# Patient Record
Sex: FEMALE | Race: WHITE | NOT HISPANIC OR LATINO | Employment: FULL TIME | URBAN - METROPOLITAN AREA
[De-identification: names, ages, dates, MRNs, and addresses within clinical notes are randomized per-mention and may not be internally consistent; named-entity substitution may affect disease eponyms.]

---

## 2024-01-01 ENCOUNTER — HOSPITAL ENCOUNTER (EMERGENCY)
Facility: CLINIC | Age: 34
Discharge: HOME | End: 2024-01-01
Attending: EMERGENCY MEDICINE | Admitting: EMERGENCY MEDICINE
Payer: COMMERCIAL

## 2024-01-01 ENCOUNTER — APPOINTMENT (OUTPATIENT)
Dept: CT IMAGING | Facility: CLINIC | Age: 34
End: 2024-01-01
Attending: EMERGENCY MEDICINE
Payer: COMMERCIAL

## 2024-01-01 VITALS
TEMPERATURE: 97.6 F | OXYGEN SATURATION: 99 % | RESPIRATION RATE: 16 BRPM | SYSTOLIC BLOOD PRESSURE: 144 MMHG | DIASTOLIC BLOOD PRESSURE: 87 MMHG | BODY MASS INDEX: 30.04 KG/M2 | WEIGHT: 175 LBS | HEART RATE: 89 BPM

## 2024-01-01 DIAGNOSIS — R07.9 CHEST PAIN, UNSPECIFIED TYPE: ICD-10-CM

## 2024-01-01 DIAGNOSIS — I30.8 OTHER ACUTE PERICARDITIS: ICD-10-CM

## 2024-01-01 DIAGNOSIS — I31.39 PERICARDIAL EFFUSION: ICD-10-CM

## 2024-01-01 LAB
ALBUMIN SERPL BCG-MCNC: 4.2 G/DL (ref 3.5–5.2)
ALP SERPL-CCNC: 90 U/L (ref 40–150)
ALT SERPL W P-5'-P-CCNC: 28 U/L (ref 0–50)
ANION GAP SERPL CALCULATED.3IONS-SCNC: 9 MMOL/L (ref 7–15)
AST SERPL W P-5'-P-CCNC: 28 U/L (ref 0–45)
BASOPHILS # BLD AUTO: 0.1 10E3/UL (ref 0–0.2)
BASOPHILS NFR BLD AUTO: 1 %
BILIRUB SERPL-MCNC: 0.3 MG/DL
BUN SERPL-MCNC: 15.7 MG/DL (ref 6–20)
CALCIUM SERPL-MCNC: 9.1 MG/DL (ref 8.6–10)
CHLORIDE SERPL-SCNC: 102 MMOL/L (ref 98–107)
CREAT SERPL-MCNC: 0.85 MG/DL (ref 0.51–0.95)
D DIMER PPP FEU-MCNC: 1.72 UG/ML FEU (ref 0–0.5)
DEPRECATED HCO3 PLAS-SCNC: 25 MMOL/L (ref 22–29)
EGFRCR SERPLBLD CKD-EPI 2021: >90 ML/MIN/1.73M2
EOSINOPHIL # BLD AUTO: 0.1 10E3/UL (ref 0–0.7)
EOSINOPHIL NFR BLD AUTO: 2 %
ERYTHROCYTE [DISTWIDTH] IN BLOOD BY AUTOMATED COUNT: 12.5 % (ref 10–15)
GLUCOSE SERPL-MCNC: 105 MG/DL (ref 70–99)
HCG SERPL QL: NEGATIVE
HCT VFR BLD AUTO: 36.9 % (ref 35–47)
HGB BLD-MCNC: 12.6 G/DL (ref 11.7–15.7)
IMM GRANULOCYTES # BLD: 0 10E3/UL
IMM GRANULOCYTES NFR BLD: 0 %
LYMPHOCYTES # BLD AUTO: 1.8 10E3/UL (ref 0.8–5.3)
LYMPHOCYTES NFR BLD AUTO: 25 %
MCH RBC QN AUTO: 29.4 PG (ref 26.5–33)
MCHC RBC AUTO-ENTMCNC: 34.1 G/DL (ref 31.5–36.5)
MCV RBC AUTO: 86 FL (ref 78–100)
MONOCYTES # BLD AUTO: 0.6 10E3/UL (ref 0–1.3)
MONOCYTES NFR BLD AUTO: 8 %
NEUTROPHILS # BLD AUTO: 4.7 10E3/UL (ref 1.6–8.3)
NEUTROPHILS NFR BLD AUTO: 64 %
NRBC # BLD AUTO: 0 10E3/UL
NRBC BLD AUTO-RTO: 0 /100
PLATELET # BLD AUTO: 289 10E3/UL (ref 150–450)
POTASSIUM SERPL-SCNC: 4.1 MMOL/L (ref 3.4–5.3)
PROT SERPL-MCNC: 7.1 G/DL (ref 6.4–8.3)
RBC # BLD AUTO: 4.28 10E6/UL (ref 3.8–5.2)
SODIUM SERPL-SCNC: 136 MMOL/L (ref 135–145)
TROPONIN T SERPL HS-MCNC: <6 NG/L
WBC # BLD AUTO: 7.3 10E3/UL (ref 4–11)

## 2024-01-01 PROCEDURE — 36415 COLL VENOUS BLD VENIPUNCTURE: CPT | Performed by: EMERGENCY MEDICINE

## 2024-01-01 PROCEDURE — 250N000009 HC RX 250: Performed by: EMERGENCY MEDICINE

## 2024-01-01 PROCEDURE — 85025 COMPLETE CBC W/AUTO DIFF WBC: CPT | Performed by: EMERGENCY MEDICINE

## 2024-01-01 PROCEDURE — 71275 CT ANGIOGRAPHY CHEST: CPT

## 2024-01-01 PROCEDURE — 80053 COMPREHEN METABOLIC PANEL: CPT | Performed by: EMERGENCY MEDICINE

## 2024-01-01 PROCEDURE — 84484 ASSAY OF TROPONIN QUANT: CPT | Performed by: EMERGENCY MEDICINE

## 2024-01-01 PROCEDURE — 84703 CHORIONIC GONADOTROPIN ASSAY: CPT | Performed by: EMERGENCY MEDICINE

## 2024-01-01 PROCEDURE — 85379 FIBRIN DEGRADATION QUANT: CPT | Performed by: EMERGENCY MEDICINE

## 2024-01-01 PROCEDURE — 250N000011 HC RX IP 250 OP 636: Performed by: EMERGENCY MEDICINE

## 2024-01-01 PROCEDURE — 99285 EMERGENCY DEPT VISIT HI MDM: CPT | Mod: 25

## 2024-01-01 RX ORDER — IOPAMIDOL 755 MG/ML
66 INJECTION, SOLUTION INTRAVASCULAR ONCE
Status: COMPLETED | OUTPATIENT
Start: 2024-01-01 | End: 2024-01-01

## 2024-01-01 RX ORDER — DOXYCYCLINE 100 MG/1
100 CAPSULE ORAL EVERY 12 HOURS SCHEDULED
Status: DISCONTINUED | OUTPATIENT
Start: 2024-01-01 | End: 2024-01-01

## 2024-01-01 RX ADMIN — SODIUM CHLORIDE 91 ML: 9 INJECTION, SOLUTION INTRAVENOUS at 02:45

## 2024-01-01 RX ADMIN — IOPAMIDOL 66 ML: 755 INJECTION, SOLUTION INTRAVENOUS at 02:45

## 2024-01-01 ASSESSMENT — ACTIVITIES OF DAILY LIVING (ADL)
ADLS_ACUITY_SCORE: 35
ADLS_ACUITY_SCORE: 35

## 2024-01-01 NOTE — ED TRIAGE NOTES
Pt arrives via triage presenting with sudden onset upper chest pressure beginning about 30 minutes ago.      Triage Assessment (Adult)       Row Name 01/01/24 0052          Triage Assessment    Airway WDL WDL        Respiratory WDL    Respiratory WDL WDL        Skin Circulation/Temperature WDL    Skin Circulation/Temperature WDL WDL        Cardiac WDL    Cardiac WDL chest pain        Chest Pain Assessment    Chest Pain Location epigastric

## 2024-01-01 NOTE — DISCHARGE INSTRUCTIONS
Please take ibuprofen, 600 mg every 8 hours for the next 10 days.  Please then wean off of the ibuprofen over the next 3 weeks.  Please take it for total of 4 weeks.  I have ordered an outpatient echocardiogram for you.  They will call to schedule.  I have ordered a cardiology consult, they will call you to schedule.    Discharge Instructions  Chest Pain    You have been seen today for chest pain or discomfort.  At this time, your provider has found no signs that your chest pain is due to a serious or life-threatening condition, (or you have declined more testing and/or admission to the hospital). However, sometimes there is a serious problem that does not show up right away. Your evaluation today may not be complete and you may need further testing and evaluation.     Generally, every Emergency Department visit should have a follow-up clinic visit with either a primary or a specialty clinic/provider. Please follow-up as instructed by your emergency provider today.  Return to the Emergency Department if:  Your chest pain changes, gets worse, starts to happen more often, or comes with less activity.  You are newly short of breath.  You get very weak or tired.  You pass out or faint.  You have any new symptoms, like fever, cough, numb legs, or you cough up blood.  You have anything else that worries you.    Until you follow-up with your regular provider, please do the following:  Take one aspirin daily unless you have an allergy or are told not to by your provider.  If a stress test appointment has been made, go to the appointment.  If you have questions, contact your regular provider.  Follow-up with your regular provider/clinic as directed; this is very important.    If you were given a prescription for medicine here today, be sure to read all of the information (including the package insert) that comes with your prescription.  This will include important information about the medicine, its side effects, and any  warnings that you need to know about.  The pharmacist who fills the prescription can provide more information and answer questions you may have about the medicine.  If you have questions or concerns that the pharmacist cannot address, please call or return to the Emergency Department.       Remember that you can always come back to the Emergency Department if you are not able to see your regular provider in the amount of time listed above, if you get any new symptoms, or if there is anything that worries you.

## 2024-01-01 NOTE — ED PROVIDER NOTES
History     Chief Complaint:  Chest Pain       HPI   Kyra Panda is a 33 year old female who presents to the ED with chest pain. Patient reports her chest pain started around 2 weeks ago. She describes the chest pain as a pressure. Notes exacerbating factors that include deep breathes, bending over, standing up, and laying flat. Denies fever and coughing. Denies recent illness, history of blood clots, diabetes mellitus, hypertension, and hypercholesterolemia.     Independent Historian:   None - Patient Only    Review of External Notes:          Medications:    Mirena   Cytotec   Keflex    Past Medical History:    Idiopathic thrombocytopenic purpura    Past Surgical History:    History reviewed. No pertinent surgical history.     Physical Exam   Patient Vitals for the past 24 hrs:   BP Temp Pulse Resp SpO2 Weight   01/01/24 0300 (!) 144/87 -- 89 -- 99 % --   01/01/24 0229 135/80 -- 87 -- -- --   01/01/24 0054 (!) 154/69 97.6  F (36.4  C) 93 16 100 % 79.4 kg (175 lb)        Physical Exam  General: Well-nourished, resting comfortably when I enter the room  Eyes: Pupils equal, conjunctivae pink no scleral icterus or conjunctival injection  ENT:  Moist mucus membranes  Respiratory:  Lungs clear to auscultation bilaterally, no crackles/rubs/wheezes.  Good air movement  CV: Normal rate and rhythm, no murmurs  GI:  Abdomen soft and non-distended.  No tenderness, guarding or rebound  Skin: Warm, dry.  No rashes or petechiae  Musculoskeletal: No peripheral edema or calf tenderness  Neuro: Alert and oriented to person/place/time  Psychiatric: Normal affect      Emergency Department Course   ekg      Imaging:  CT Chest Pulmonary Embolism w Contrast   Final Result   IMPRESSION:       1.  Negative for pulmonary embolism.      2.  Small pericardial effusion. This is of indeterminate etiology but could account for the patient's acute symptoms. Clinical correlation.      3. No evidence for pneumonitis and no significant  findings elsewhere in the chest.      Echocardiogram Complete    (Results Pending)      Laboratory:  Labs Ordered and Resulted from Time of ED Arrival to Time of ED Departure   COMPREHENSIVE METABOLIC PANEL - Abnormal       Result Value    Sodium 136      Potassium 4.1      Carbon Dioxide (CO2) 25      Anion Gap 9      Urea Nitrogen 15.7      Creatinine 0.85      GFR Estimate >90      Calcium 9.1      Chloride 102      Glucose 105 (*)     Alkaline Phosphatase 90      AST 28      ALT 28      Protein Total 7.1      Albumin 4.2      Bilirubin Total 0.3     D DIMER QUANTITATIVE - Abnormal    D-Dimer Quantitative 1.72 (*)    TROPONIN T, HIGH SENSITIVITY - Normal    Troponin T, High Sensitivity <6     HCG QUALITATIVE PREGNANCY - Normal    hCG Serum Qualitative Negative     CBC WITH PLATELETS AND DIFFERENTIAL    WBC Count 7.3      RBC Count 4.28      Hemoglobin 12.6      Hematocrit 36.9      MCV 86      MCH 29.4      MCHC 34.1      RDW 12.5      Platelet Count 289      % Neutrophils 64      % Lymphocytes 25      % Monocytes 8      % Eosinophils 2      % Basophils 1      % Immature Granulocytes 0      NRBCs per 100 WBC 0      Absolute Neutrophils 4.7      Absolute Lymphocytes 1.8      Absolute Monocytes 0.6      Absolute Eosinophils 0.1      Absolute Basophils 0.1      Absolute Immature Granulocytes 0.0      Absolute NRBCs 0.0          Emergency Department Course & Assessments:    Interventions:  Medications   iopamidol (ISOVUE-370) solution 66 mL (66 mLs Intravenous $Given 1/1/24 0245)   sodium chloride 0.9 % CT scan flush use (91 mLs Intravenous $Given 1/1/24 0245)        Assessments:  0109 I obtained the history and examined the patient as noted above.    Independent Interpretation (X-rays, CTs, rhythm strip):      Consultations/Discussion of Management or Tests:  None        Social Determinants of Health affecting care:   None    Disposition:  The patient was discharged to home.     Impression & Plan    CMS Diagnoses:  None    Medical Decision Makin-year-old otherwise healthy female presents emergency department with a complaint of chest pain.  Patient reports that she has been having chest pain for the past 2 weeks.  Over the past week it has increased, she is having pain when she takes a deep breath then and as well when she bends over.  Tonight when she woke up out of sleep she was pale and diaphoretic, she felt lightheaded.  On exam patient is not in any acute distress.  She is not tachypneic, hypoxic, or tachycardic.  Lab work is reassuring, her D-dimer is elevated and we will get a chest CT to evaluate for PE.  CT shows a small pericardial effusion, no signs of PE or pneumonia.  Lab work is otherwise reassuring.  EKG does not show any signs of pericarditis.  Clinically along with the pericardial effusion, this would correlate with pericarditis.  I will have the patient get a outpatient echo as well as a cardiology consult.  Patient is informed of her results.  I will start her on anti-inflammatories at home.  Patient is discharged home.      Diagnosis:    ICD-10-CM    1. Chest pain, unspecified type  R07.9 Echocardiogram Complete     Adult Cardiology Eval  Referral     CANCELED: Echocardiogram Complete     CANCELED: Adult Cardiology Eval  Referral      2. Other acute pericarditis  I30.8 Echocardiogram Complete     Adult Cardiology Eval  Referral      3. Pericardial effusion  I31.39 Echocardiogram Complete     Adult Cardiology Eval  Referral           Discharge Medications:  Discharge Medication List as of 2024  3:42 AM           Scribe Disclosure:  IJamie, am serving as a scribe at 1:17 AM on 2024 to document services personally performed by Marixa Rucker MD based on my observations and the provider's statements to me.     2024   Marixa Rucker MD Richardson, Elizabeth, MD  [phone]

## 2024-01-18 ENCOUNTER — HOSPITAL ENCOUNTER (OUTPATIENT)
Dept: CARDIOLOGY | Facility: CLINIC | Age: 34
Discharge: HOME | End: 2024-01-18
Attending: EMERGENCY MEDICINE | Admitting: EMERGENCY MEDICINE
Payer: COMMERCIAL

## 2024-01-18 DIAGNOSIS — I30.8 OTHER ACUTE PERICARDITIS: ICD-10-CM

## 2024-01-18 DIAGNOSIS — I31.39 PERICARDIAL EFFUSION: ICD-10-CM

## 2024-01-18 DIAGNOSIS — R07.9 CHEST PAIN, UNSPECIFIED TYPE: ICD-10-CM

## 2024-01-18 LAB — LVEF ECHO: NORMAL

## 2024-01-18 PROCEDURE — 93306 TTE W/DOPPLER COMPLETE: CPT

## 2024-01-18 PROCEDURE — 93306 TTE W/DOPPLER COMPLETE: CPT | Mod: 26 | Performed by: INTERNAL MEDICINE

## 2024-01-23 ENCOUNTER — APPOINTMENT (OUTPATIENT)
Dept: GENERAL RADIOLOGY | Facility: CLINIC | Age: 34
End: 2024-01-23
Attending: SOCIAL WORKER
Payer: COMMERCIAL

## 2024-01-23 LAB
ANION GAP SERPL CALCULATED.3IONS-SCNC: 8 MMOL/L (ref 7–15)
BUN SERPL-MCNC: 13 MG/DL (ref 6–20)
CALCIUM SERPL-MCNC: 8.9 MG/DL (ref 8.6–10)
CHLORIDE SERPL-SCNC: 103 MMOL/L (ref 98–107)
CREAT SERPL-MCNC: 0.74 MG/DL (ref 0.51–0.95)
DEPRECATED HCO3 PLAS-SCNC: 27 MMOL/L (ref 22–29)
EGFRCR SERPLBLD CKD-EPI 2021: >90 ML/MIN/1.73M2
ERYTHROCYTE [DISTWIDTH] IN BLOOD BY AUTOMATED COUNT: 12.8 % (ref 10–15)
GLUCOSE SERPL-MCNC: 108 MG/DL (ref 70–99)
HCG INTACT+B SERPL-ACNC: <1 MIU/ML
HCT VFR BLD AUTO: 35.2 % (ref 35–47)
HGB BLD-MCNC: 12.1 G/DL (ref 11.7–15.7)
MCH RBC QN AUTO: 30 PG (ref 26.5–33)
MCHC RBC AUTO-ENTMCNC: 34.4 G/DL (ref 31.5–36.5)
MCV RBC AUTO: 87 FL (ref 78–100)
PLATELET # BLD AUTO: 237 10E3/UL (ref 150–450)
POTASSIUM SERPL-SCNC: 4.2 MMOL/L (ref 3.4–5.3)
RBC # BLD AUTO: 4.04 10E6/UL (ref 3.8–5.2)
SODIUM SERPL-SCNC: 138 MMOL/L (ref 135–145)
TROPONIN T SERPL HS-MCNC: <6 NG/L
WBC # BLD AUTO: 7.1 10E3/UL (ref 4–11)

## 2024-01-23 PROCEDURE — 85027 COMPLETE CBC AUTOMATED: CPT | Performed by: EMERGENCY MEDICINE

## 2024-01-23 PROCEDURE — 80048 BASIC METABOLIC PNL TOTAL CA: CPT | Performed by: EMERGENCY MEDICINE

## 2024-01-23 PROCEDURE — 84484 ASSAY OF TROPONIN QUANT: CPT | Performed by: EMERGENCY MEDICINE

## 2024-01-23 PROCEDURE — 84702 CHORIONIC GONADOTROPIN TEST: CPT | Performed by: EMERGENCY MEDICINE

## 2024-01-23 PROCEDURE — 85027 COMPLETE CBC AUTOMATED: CPT | Performed by: SOCIAL WORKER

## 2024-01-23 PROCEDURE — 71046 X-RAY EXAM CHEST 2 VIEWS: CPT

## 2024-01-23 PROCEDURE — 84484 ASSAY OF TROPONIN QUANT: CPT | Performed by: SOCIAL WORKER

## 2024-01-23 PROCEDURE — 84443 ASSAY THYROID STIM HORMONE: CPT | Performed by: EMERGENCY MEDICINE

## 2024-01-23 PROCEDURE — 99285 EMERGENCY DEPT VISIT HI MDM: CPT | Mod: 25

## 2024-01-23 PROCEDURE — 36415 COLL VENOUS BLD VENIPUNCTURE: CPT | Performed by: SOCIAL WORKER

## 2024-01-23 PROCEDURE — 93005 ELECTROCARDIOGRAM TRACING: CPT

## 2024-01-23 PROCEDURE — 84439 ASSAY OF FREE THYROXINE: CPT | Performed by: EMERGENCY MEDICINE

## 2024-01-23 PROCEDURE — 80048 BASIC METABOLIC PNL TOTAL CA: CPT | Performed by: SOCIAL WORKER

## 2024-01-24 ENCOUNTER — HOSPITAL ENCOUNTER (EMERGENCY)
Facility: CLINIC | Age: 34
Discharge: HOME | End: 2024-01-24
Attending: EMERGENCY MEDICINE | Admitting: EMERGENCY MEDICINE
Payer: COMMERCIAL

## 2024-01-24 ENCOUNTER — TELEPHONE (OUTPATIENT)
Dept: EMERGENCY MEDICINE | Facility: CLINIC | Age: 34
End: 2024-01-24
Payer: COMMERCIAL

## 2024-01-24 VITALS
TEMPERATURE: 98.3 F | SYSTOLIC BLOOD PRESSURE: 117 MMHG | WEIGHT: 180 LBS | HEART RATE: 82 BPM | RESPIRATION RATE: 20 BRPM | OXYGEN SATURATION: 98 % | BODY MASS INDEX: 30.73 KG/M2 | HEIGHT: 64 IN | DIASTOLIC BLOOD PRESSURE: 72 MMHG

## 2024-01-24 DIAGNOSIS — I31.39 PERICARDIAL EFFUSION: ICD-10-CM

## 2024-01-24 LAB
ATRIAL RATE - MUSE: 86 BPM
DIASTOLIC BLOOD PRESSURE - MUSE: NORMAL MMHG
INTERPRETATION ECG - MUSE: NORMAL
P AXIS - MUSE: 74 DEGREES
PR INTERVAL - MUSE: 166 MS
QRS DURATION - MUSE: 80 MS
QT - MUSE: 356 MS
QTC - MUSE: 426 MS
R AXIS - MUSE: 69 DEGREES
SYSTOLIC BLOOD PRESSURE - MUSE: NORMAL MMHG
T AXIS - MUSE: 45 DEGREES
T4 FREE SERPL-MCNC: 1.18 NG/DL (ref 0.9–1.7)
TSH SERPL DL<=0.005 MIU/L-ACNC: 6.11 UIU/ML (ref 0.3–4.2)
VENTRICULAR RATE- MUSE: 86 BPM

## 2024-01-24 RX ORDER — COLCHICINE 0.6 MG/1
0.6 TABLET ORAL DAILY
Qty: 30 TABLET | Refills: 0 | Status: SHIPPED | OUTPATIENT
Start: 2024-01-24 | End: 2024-02-02

## 2024-01-24 ASSESSMENT — ACTIVITIES OF DAILY LIVING (ADL): ADLS_ACUITY_SCORE: 35

## 2024-01-24 NOTE — TELEPHONE ENCOUNTER
[facility]    Reason for call: Lab Result Notification     Lab Result (including Rx patient on, if applicable).  If culture, copy of lab report at bottom.  Lab Result:     Resulted after [facility] Emergency Dept visit on this date 1/23/24.  [facility] patients, route result to PCP.   Non [facility] patients, RN to notify patient/parent of result and advise to relay result to their PCP immediately.  [ED lab result f/u RN may want to fax result to PCP].    Component      Latest Ref Rng 1/23/2024  9:50 PM   TSH      0.30 - 4.20 uIU/mL 6.11 (H)    T4 Free      0.90 - 1.70 ng/dL 1.18       Legend:  (H) High    Creatinine Level (mg/dl)   Creatinine   Date Value Ref Range Status   01/23/2024 0.74 0.51 - 0.95 mg/dL Final   03/21/2012 0.79 0.52 - 1.04 mg/dL Final    Creatinine clearance (ml/min), if applicable Serum creatinine: 0.74 mg/dL 01/23/24 2150  Estimated creatinine clearance: 112.9 mL/min     Patient's current Symptoms:   Left message then received return call from patient     RN Recommendations/Instructions per [place] ED lab result protocol:   [facility] ED lab result protocol utilized: [facility]  RN reviewed information about lab notification, follow up with primary care for interpretation and recommendation. Patient agrees with plan..     Patient/care giver notified to contact your PCP clinic or return to the Emergency department if your:  Symptoms return.  Symptoms worsen or other concerning symptoms.    Mayco Raymond RN

## 2024-01-24 NOTE — DISCHARGE INSTRUCTIONS
The colchicine as directed.  Discussed with cardiology whether or not this medicine should be continued or not.

## 2024-01-24 NOTE — ED PROVIDER NOTES
History     Chief Complaint:  Chest Pain       HPI   Kyra Panda is a 33 year old female who presents to the ED with recurrent symptoms of mild chest discomfort with supine positioning, and feeling at times a sense of dyspnea.  The patient was seen on New Year's Day on January 1 and underwent an evaluation for this.  She had a slightly elevated D-dimer which triggered a CT scan of the chest which did not show PE, pneumonia, pleural effusions or other pulmonary etiologies, it did show a trace pericardial effusion.  The patient was sent home with a presumptive diagnosis of possible pericarditis and took ibuprofen for 10 days with good symptom relief.  She had some mild recurrent symptoms and ended up getting an outpatient cardiac echo which was negative except for trivial/trace pericardial effusion with a normal ejection fraction.  She has an outpatient upcoming appointment/consultation with cardiology.  She has had some ongoing recurrence of symptoms which prompted her visit to the emergency department.  She is not having chest pressure or tightness.  She gets feelings largely when supine feeling a little bit more short of breath.  If she takes a maximal deep breath she does feel a sense of pleuritic chest discomfort so she breathes more shallowly.      Independent Historian:   None    Review of External Notes:  Recent laboratories, and the recent CT scan and echo were reviewed.    Allergies:  No Known Allergies     Listed Medications:    colchicine (COLCRYS) 0.6 MG tablet  levonorgestrel (MIRENA) 20 MCG/24HR IUD  misoprostol (CYTOTEC) 200 MCG tablet        Past Medical and Surgical History:    Past Medical History:   Diagnosis Date    ITP (idiopathic thrombocytopenic purpura)      No past surgical history on file.     Family History:    family history is not on file.    Social History:   reports that she has never smoked. She does not have any smokeless tobacco history on file. She reports current alcohol  "use. She reports that she does not use drugs.      Physical Exam   Patient Vitals for the past 24 hrs:   BP Temp Temp src Pulse Resp SpO2 Height Weight   01/24/24 0532 117/72 -- -- 82 20 98 % -- --   01/24/24 0502 116/77 -- -- 78 17 99 % -- --   01/24/24 0433 117/79 -- -- 79 18 100 % -- --   01/23/24 2139 (!) 140/75 98.3  F (36.8  C) Oral 89 16 100 % 1.626 m (5' 4\") 81.6 kg (180 lb)        General: Resting comfortably on the gurney  Head:  The scalp, face, and head appear normal  Eyes:  The pupils are equal, round, and reactive to light    There is no nystagmus    Extraocular muscles are intact    Conjunctivae and sclerae are normal  ENT:    The nose is normal    Pinnae are normal    The oropharynx is normal  Neck:  Normal range of motion    There is no rigidity noted  CV:  Regular rate  Normal underlying rhythm     Normal S1/S2    No pathological murmur detected  Resp:  Lungs are clear    There is no tachypnea    Non-labored    No rales    No wheezing   MS:  Symmetric motor strength    No major joint effusions    No asymmetric leg swelling, no calf tenderness  Skin:  No rash or acute skin lesions noted  Neuro:  Speech is normal and fluent, there is no aphasia    No motor deficits    Cranial nerves are intact  Psych: Awake. Alert.      Normal affect.  Appropriate interactions.            Emergency Department Course       Imaging:  Chest XR,  PA & LAT   Final Result   IMPRESSION: No focal airspace consolidation. No pleural effusion or pneumothorax.      Upper limits of normal heart size.      There is lucency involving the right neck soft tissues, possibly artifactual. Correlate for soft tissue abnormality in this location and consider dedicated neck imaging, if clinically indicated.           Reports in this section have been read by the radiologist.    Laboratory:  Labs Ordered and Resulted from Time of ED Arrival to Time of ED Departure   BASIC METABOLIC PANEL - Abnormal       Result Value    Sodium 138      " Potassium 4.2      Chloride 103      Carbon Dioxide (CO2) 27      Anion Gap 8      Urea Nitrogen 13.0      Creatinine 0.74      GFR Estimate >90      Calcium 8.9      Glucose 108 (*)    CBC WITH PLATELETS - Normal    WBC Count 7.1      RBC Count 4.04      Hemoglobin 12.1      Hematocrit 35.2      MCV 87      MCH 30.0      MCHC 34.4      RDW 12.8      Platelet Count 237     TROPONIN T, HIGH SENSITIVITY - Normal    Troponin T, High Sensitivity <6     HCG QUANTITATIVE PREGNANCY - Normal    hCG Quantitative <1          Procedures:  Procedures       Emergency Department Course & Assessments:             Interventions/ED Medications:  Medications - No data to display       Independent Interpretation of Radiology Studies by Dr. Pascual  Chest x-ray is reviewed and is within normal limits    Assessments/Consultations/Discussion of Management:     Patient was assessed for initial H&P.  I then reviewed her echo and chest CT that was done recently and met with her again to go over all of these results.    Disposition:  Home    Impression & Plan        Medical Decision Making:  This patient presents with recurrent symptoms of mild dyspnea and a sense of pleuritic chest pain with maximal deep breath.  The patient's laboratories are within normal limits.  Her troponin is undetectable.  She had a recent echo which was normal except for a trivial pericardial effusion.  She had a recent CT scan of the chest which showed no PE and a very small pericardial effusion.  The patient more than likely has had a episode of recent pericarditis or an idiopathic pericardial effusion that is quite small and she may be having symptoms from this.  She has cardiology consultation within the next week.  I am going to start the patient on colchicine once daily for the next several weeks until she can discuss this treatment option with the cardiologist.  She does not technically have recurrent pericarditis because this would be her first episode but she  does have recurrence in symptoms and an ongoing very small pericardial effusion.  No other life-threatening etiologies are detected based on physical examination or recent workup.  There is a broad differential diagnosis to feelings of shortness of breath and chest discomfort which includes acute coronary syndrome,, myocardial infarction, PE, pericarditis, pneumonia, pneumothorax, the patient has been extensively evaluated for all of these etiologies and no life-threatening etiology has been noted.  The only finding on her workup to date is a very small pericardial effusion which may be causing recurrent symptoms.        Diagnosis:    ICD-10-CM    1. Pericardial effusion  I31.39              Discharge Medications (if applicable):  Discharge Medication List as of 1/24/2024  5:27 AM        START taking these medications    Details   colchicine (COLCRYS) 0.6 MG tablet Take 1 tablet (0.6 mg) by mouth daily for 30 days, Disp-30 tablet, R-0, E-Prescribe                Luis Pascual MD  1/24/2024   No att. providers found        Luis Pascual MD  01/24/24 7799

## 2024-01-24 NOTE — ED TRIAGE NOTES
Patient was seen earlier this month for chest pain and was dx with pleural effusions. Patient had echo the other day and has an apt with cardiology coming up. Over the lst couple days patient noticed an increase in chest pain, cough, and a little SOB.      Triage Assessment (Adult)       Row Name 01/23/24 6326          Triage Assessment    Airway WDL WDL        Respiratory WDL    Respiratory WDL X;cough     Cough Frequency infrequent        Skin Circulation/Temperature WDL    Skin Circulation/Temperature WDL WDL        Cardiac WDL    Cardiac WDL X;chest pain  pressure, pain with movement        Chest Pain Assessment    Chest Pain Location anterior chest, right;arm, left     Character pressure        Peripheral/Neurovascular WDL    Peripheral Neurovascular WDL WDL        Cognitive/Neuro/Behavioral WDL    Cognitive/Neuro/Behavioral WDL WDL        Rosa Coma Scale    Best Eye Response 4-->(E4) spontaneous     Best Motor Response 6-->(M6) obeys commands     Best Verbal Response 5-->(V5) oriented     Rosa Coma Scale Score 15

## 2024-01-25 ENCOUNTER — OFFICE VISIT (OUTPATIENT)
Dept: PEDIATRICS | Facility: CLINIC | Age: 34
End: 2024-01-25
Payer: COMMERCIAL

## 2024-01-25 VITALS
SYSTOLIC BLOOD PRESSURE: 104 MMHG | BODY MASS INDEX: 32.01 KG/M2 | TEMPERATURE: 98.1 F | OXYGEN SATURATION: 99 % | DIASTOLIC BLOOD PRESSURE: 66 MMHG | WEIGHT: 187.5 LBS | HEIGHT: 64 IN | HEART RATE: 78 BPM

## 2024-01-25 DIAGNOSIS — E03.8 SUBCLINICAL HYPOTHYROIDISM: Primary | ICD-10-CM

## 2024-01-25 DIAGNOSIS — Z86.2 HISTORY OF ITP: ICD-10-CM

## 2024-01-25 DIAGNOSIS — I31.9 PERICARDITIS, UNSPECIFIED CHRONICITY, UNSPECIFIED TYPE: ICD-10-CM

## 2024-01-25 DIAGNOSIS — Z82.69 FAMILY HISTORY OF SYSTEMIC LUPUS ERYTHEMATOSUS: ICD-10-CM

## 2024-01-25 PROCEDURE — 99204 OFFICE O/P NEW MOD 45 MIN: CPT | Performed by: NURSE PRACTITIONER

## 2024-01-25 ASSESSMENT — PAIN SCALES - GENERAL: PAINLEVEL: MODERATE PAIN (4)

## 2024-01-25 NOTE — PROGRESS NOTES
"  Assessment & Plan     Family history of systemic lupus erythematosus    History of ITP    Subclinical hypothyroidism  Incidental finding, likely unrelated to pericarditis. Explained pathophys of subclinical hypothyroidism. Pt is essentially asymptomatic. Recheck 8 weeks  - TSH with free T4 reflex; Future  - Thyroid peroxidase antibody; Future    Pericarditis, unspecified chronicity, unspecified type  Improving somewhat. Per uptodate, NSAIDs plus colchicine are more effective than colchicine alone. Re-start NSAIDs. Reviewed r/b/se of both meds. Reviewed reasons to seek care again in the ED. Seeing cards next week          BMI  Estimated body mass index is 32.57 kg/m  as calculated from the following:    Height as of this encounter: 1.616 m (5' 3.62\").    Weight as of this encounter: 85 kg (187 lb 8 oz).   Weight management plan: Discussed healthy diet and exercise guidelines        Dee Rae is a 33 year old, presenting for the following health issues:  ER F/U      1/25/2024     4:43 PM   Additional Questions   Roomed by Tiffanie Siddiqi   Accompanied by N/A         1/25/2024     4:43 PM   Patient Reported Additional Medications   Patient reports taking the following new medications Yes, Colchicine 0.6 MG     HPI       ED/UC Followup:    Facility:  [facility] Emergency Dept  Date of visit: 1/1/2024 and 1/24/2024  Reason for visit: Pericardial effusion   Current Status: Better the symptom still their.            Objective    /66 (BP Location: Right arm, Patient Position: Sitting, Cuff Size: Adult Regular)   Pulse 78   Temp 98.1  F (36.7  C) (Tympanic)   Ht 1.616 m (5' 3.62\")   Wt 85 kg (187 lb 8 oz)   SpO2 99%   BMI 32.57 kg/m    Body mass index is 32.57 kg/m .  Physical Exam   CONSTITUTIONAL: Alert, well-nourished, well-groomed, NAD  HRRR S1 S2 No MRG. No peripheral edema  Lungs CTA. No wheeze, rhonchi, rales.  Neck: No lymphadenopathy or masses. Thyroid smooth, non-tender, and " non-enlarged.  PSYCH: Bright affect. Appropriate mentation and speech.           Signed Electronically by: JAZMINE HATFIELD CNP

## 2024-01-25 NOTE — PATIENT INSTRUCTIONS
You have subclinical hypothyroidism  Possibly will turn into hypothyroidism. Most of these are hashimoto's, which is autoimmune.     Along with colchicine, add 600mg ibuprofen every 6 hours with food. If that doesn't seem to improve symptoms, let me know and we might try a different NSAID called indomethacin    Let me know if worsening

## 2024-01-31 ENCOUNTER — OFFICE VISIT (OUTPATIENT)
Dept: CARDIOLOGY | Facility: CLINIC | Age: 34
End: 2024-01-31
Attending: EMERGENCY MEDICINE
Payer: COMMERCIAL

## 2024-01-31 VITALS
BODY MASS INDEX: 31.76 KG/M2 | HEART RATE: 78 BPM | WEIGHT: 186 LBS | HEIGHT: 64 IN | SYSTOLIC BLOOD PRESSURE: 113 MMHG | DIASTOLIC BLOOD PRESSURE: 75 MMHG

## 2024-01-31 DIAGNOSIS — R07.9 CHEST PAIN, UNSPECIFIED TYPE: ICD-10-CM

## 2024-01-31 DIAGNOSIS — I31.39 PERICARDIAL EFFUSION: ICD-10-CM

## 2024-01-31 DIAGNOSIS — I30.8 OTHER ACUTE PERICARDITIS: ICD-10-CM

## 2024-01-31 PROCEDURE — 99204 OFFICE O/P NEW MOD 45 MIN: CPT | Performed by: INTERNAL MEDICINE

## 2024-01-31 RX ORDER — IBUPROFEN 600 MG/1
600 TABLET, FILM COATED ORAL EVERY 6 HOURS PRN
COMMUNITY
End: 2024-02-12

## 2024-01-31 NOTE — PROGRESS NOTES
HPI and Plan:   Patient is a very nice 33-year-old woman who has been dealing with pericarditis over the last month.  She thinks symptoms started back in December where she would notice that deep breathing would cause some chest discomfort.  It then became acutely worse such that she has significant symptoms when lying down and was seen in the ER.  She had elevated D-dimer for which she had a negative CT scan.  She was started on ibuprofen for 10 days.  This resulted in resolution of her symptoms only to return after she completed her 10-day course.  She was then seen in the ER again at which time she was started on colchicine once daily.  She had improvement in symptoms but did not have complete resolution.  She followed up with her primary care doctor who reinitiated NSAIDs.  She had an echocardiogram done on the 18th described an ejection fraction of 60 to 65% with trivial pericardial effusion.  EKG was a normal sinus rhythm and a normal EKG.  Kyra does not remember any specific viral syndrome.    Kyra does have a family history of lupus and rheumatoid arthritis in her mom and aunt.  As a child she had ITP.    She is currently feeling much better but has not been doing anything and wants to know if she can get back to walking.  She has not been sleeping flat in the bed yet.  She has stopped all caffeine and alcohol and wants to know if she can resume these.  She normally just drinks alcohol at social occasions.  She states she does have a bachelorette party coming up.  She also is having a wedding celebration.  She is already gotten  as she and her  eloped.  As she states she is doing things backwards.    She has questions about constriction, whether she should have an MRI    Assessment and plan.  Kyra has pericarditis that appears to be responding to the combination of colchicine and ibuprofen.  Fortunately ibuprofen is not bothering her stomach.  She takes it 3 times a day with meals.  I  have recommended that she increase her colchicine to twice daily dosing.  After 1 week that she cut her ibuprofen down to 400 mg 3 times daily with meals.  If after 1 week her symptoms are doing fine then I would cut it down to 200 mg 3 times daily with meals.  After another week if asymptomatic I would cut her ibuprofen out altogether.  I will have her follow-up with my KRISTIE in about 1 month with a repeat echocardiogram.    At this time I do not think she needs an MRI.  I think she is low risk for constriction but we can watch this with her follow-up echocardiogram.    I have also recommended that she get an evaluation through rheumatology but wait until all symptoms have resolved and she is off her medications.  Thank you for allow me to participate in this patient's care.  Sincerely,                               Branden Young MD [facility]    Today's clinic visit entailed:  Review of external notes as documented elsewhere in note  Review of the result(s) of each unique test - echocardiogram, EKG, lab work  The following tests were independently interpreted by me as noted in my documentation: EKG  Ordering of each unique test  Prescription drug management  50 minutes spent by me on the date of the encounter doing chart review, history and exam, documentation and further activities per the note  Provider  Link to [facility] Help Grid     The level of medical decision making during this visit was of moderate complexity.      Orders Placed This Encounter   Procedures    Follow-Up with Cardiology KRISTIE    Echocardiogram Limited       Orders Placed This Encounter   Medications    ibuprofen (ADVIL/MOTRIN) 600 MG tablet     Sig: Take 600 mg by mouth every 6 hours as needed for moderate pain       There are no discontinued medications.      Encounter Diagnoses   Name Primary?    Chest pain, unspecified type     Other acute pericarditis     Pericardial effusion        CURRENT MEDICATIONS:  Current Outpatient Medications   Medication  Sig Dispense Refill    ibuprofen (ADVIL/MOTRIN) 600 MG tablet Take 600 mg by mouth every 6 hours as needed for moderate pain      levonorgestrel (MIRENA) 20 MCG/24HR IUD 1 each (20 mcg) by Intrauterine route once for 1 dose 1 each 0    colchicine (COLCRYS) 0.6 MG tablet Take 1 tablet (0.6 mg) by mouth daily for 30 days (Patient not taking: Reported on 1/31/2024) 30 tablet 0       ALLERGIES   No Known Allergies    PAST MEDICAL HISTORY:  Past Medical History:   Diagnosis Date    ITP (idiopathic thrombocytopenic purpura)        PAST SURGICAL HISTORY:  History reviewed. No pertinent surgical history.    FAMILY HISTORY:  Family History   Problem Relation Age of Onset    Lupus Mother         Has antibodies but no symptoms    Hypertension Maternal Grandmother     Thyroid Disease Maternal Grandmother     Heart Failure Maternal Grandmother     Atrial fibrillation Maternal Grandmother     Lupus Maternal Aunt        SOCIAL HISTORY:  Social History     Socioeconomic History    Marital status: Single     Spouse name: None    Number of children: None    Years of education: None    Highest education level: None   Tobacco Use    Smoking status: Never   Vaping Use    Vaping Use: Never used   Substance and Sexual Activity    Alcohol use: Not Currently    Drug use: No     Social Determinants of Health     Financial Resource Strain: Low Risk  (1/25/2024)    Financial Resource Strain     Within the past 12 months, have you or your family members you live with been unable to get utilities (heat, electricity) when it was really needed?: No   Food Insecurity: Low Risk  (1/25/2024)    Food Insecurity     Within the past 12 months, did you worry that your food would run out before you got money to buy more?: No     Within the past 12 months, did the food you bought just not last and you didn t have money to get more?: No   Transportation Needs: Low Risk  (1/25/2024)    Transportation Needs     Within the past 12 months, has lack of  "transportation kept you from medical appointments, getting your medicines, non-medical meetings or appointments, work, or from getting things that you need?: No   Interpersonal Safety: Low Risk  (1/25/2024)    Interpersonal Safety     Do you feel physically and emotionally safe where you currently live?: Yes     Within the past 12 months, have you been hit, slapped, kicked or otherwise physically hurt by someone?: No     Within the past 12 months, have you been humiliated or emotionally abused in other ways by your partner or ex-partner?: No   Housing Stability: Low Risk  (1/25/2024)    Housing Stability     Do you have housing? : Yes     Are you worried about losing your housing?: No       Review of Systems:  Skin:          Eyes:  Positive for glasses    ENT:         Respiratory:  Negative       Cardiovascular:  Negative chest pain;Positive for chest heaviness later in the day and into the night for the past 3 days  Gastroenterology:        Genitourinary:         Musculoskeletal:         Neurologic:         Psychiatric:         Heme/Lymph/Imm:         Endocrine:  Negative thyroid disorder;diabetes      Physical Exam:  Vitals: /75   Pulse 78   Ht 1.626 m (5' 4\")   Wt 84.4 kg (186 lb)   BMI 31.93 kg/m      Constitutional:  cooperative, alert and oriented, well developed, well nourished, in no acute distress overweight      Skin:  warm and dry to the touch, no apparent skin lesions or masses noted          Head:  normocephalic, no masses or lesions        Eyes:  pupils equal and round, conjunctivae and lids unremarkable, sclera white, no xanthalasma, EOMS intact, no nystagmus        Lymph:      ENT:  no pallor or cyanosis, dentition good        Neck:  no carotid bruit        Respiratory:  normal breath sounds, clear to auscultation, normal A-P diameter, normal symmetry, normal respiratory excursion, no use of accessory muscles         Cardiac: regular rhythm;no murmurs, gallops or rubs detected              "   pulses full and equal                                        GI:           Extremities and Muscular Skeletal:  no edema;no spinal abnormalities noted;normal muscle strength and tone              Neurological:  no gross motor deficits        Psych:  affect appropriate, oriented to time, person and place        CC  Marixa Rucker MD  [facility]  [address]  [place] [address]

## 2024-01-31 NOTE — LETTER
1/31/2024    BENY GIPSON, APRN CNP  [address] [place] [address]    RE: Kyra Panda       Dear Colleague,     I had the pleasure of seeing Kyra Panda in the [facility].  HPI and Plan:   Patient is a very nice 33-year-old woman who has been dealing with pericarditis over the last month.  She thinks symptoms started back in December where she would notice that deep breathing would cause some chest discomfort.  It then became acutely worse such that she has significant symptoms when lying down and was seen in the ER.  She had elevated D-dimer for which she had a negative CT scan.  She was started on ibuprofen for 10 days.  This resulted in resolution of her symptoms only to return after she completed her 10-day course.  She was then seen in the ER again at which time she was started on colchicine once daily.  She had improvement in symptoms but did not have complete resolution.  She followed up with her primary care doctor who reinitiated NSAIDs.  She had an echocardiogram done on the 18th described an ejection fraction of 60 to 65% with trivial pericardial effusion.  EKG was a normal sinus rhythm and a normal EKG.  Kyra does not remember any specific viral syndrome.    Kyra does have a family history of lupus and rheumatoid arthritis in her mom and aunt.  As a child she had ITP.    She is currently feeling much better but has not been doing anything and wants to know if she can get back to walking.  She has not been sleeping flat in the bed yet.  She has stopped all caffeine and alcohol and wants to know if she can resume these.  She normally just drinks alcohol at social occasions.  She states she does have a bachelorette party coming up.  She also is having a wedding celebration.  She is already gotten  as she and her  eloped.  As she states she is doing things backwards.    She has questions about constriction, whether she should  have an MRI    Assessment and plan.  Kyra has pericarditis that appears to be responding to the combination of colchicine and ibuprofen.  Fortunately ibuprofen is not bothering her stomach.  She takes it 3 times a day with meals.  I have recommended that she increase her colchicine to twice daily dosing.  After 1 week that she cut her ibuprofen down to 400 mg 3 times daily with meals.  If after 1 week her symptoms are doing fine then I would cut it down to 200 mg 3 times daily with meals.  After another week if asymptomatic I would cut her ibuprofen out altogether.  I will have her follow-up with my KRISTIE in about 1 month with a repeat echocardiogram.    At this time I do not think she needs an MRI.  I think she is low risk for constriction but we can watch this with her follow-up echocardiogram.    I have also recommended that she get an evaluation through rheumatology but wait until all symptoms have resolved and she is off her medications.  Thank you for allow me to participate in this patient's care.  Sincerely,                               Branden Young MD [facility]    Today's clinic visit entailed:  Review of external notes as documented elsewhere in note  Review of the result(s) of each unique test - echocardiogram, EKG, lab work  The following tests were independently interpreted by me as noted in my documentation: EKG  Ordering of each unique test  Prescription drug management  50 minutes spent by me on the date of the encounter doing chart review, history and exam, documentation and further activities per the note  Provider  Link to [facility] Help Grid     The level of medical decision making during this visit was of moderate complexity.      Orders Placed This Encounter   Procedures    Follow-Up with Cardiology KRISTIE    Echocardiogram Limited       Orders Placed This Encounter   Medications    ibuprofen (ADVIL/MOTRIN) 600 MG tablet     Sig: Take 600 mg by mouth every 6 hours as needed for moderate pain        There are no discontinued medications.      Encounter Diagnoses   Name Primary?    Chest pain, unspecified type     Other acute pericarditis     Pericardial effusion        CURRENT MEDICATIONS:  Current Outpatient Medications   Medication Sig Dispense Refill    ibuprofen (ADVIL/MOTRIN) 600 MG tablet Take 600 mg by mouth every 6 hours as needed for moderate pain      levonorgestrel (MIRENA) 20 MCG/24HR IUD 1 each (20 mcg) by Intrauterine route once for 1 dose 1 each 0    colchicine (COLCRYS) 0.6 MG tablet Take 1 tablet (0.6 mg) by mouth daily for 30 days (Patient not taking: Reported on 1/31/2024) 30 tablet 0       ALLERGIES   No Known Allergies    PAST MEDICAL HISTORY:  Past Medical History:   Diagnosis Date    ITP (idiopathic thrombocytopenic purpura)        PAST SURGICAL HISTORY:  History reviewed. No pertinent surgical history.    FAMILY HISTORY:  Family History   Problem Relation Age of Onset    Lupus Mother         Has antibodies but no symptoms    Hypertension Maternal Grandmother     Thyroid Disease Maternal Grandmother     Heart Failure Maternal Grandmother     Atrial fibrillation Maternal Grandmother     Lupus Maternal Aunt        SOCIAL HISTORY:  Social History     Socioeconomic History    Marital status: Single     Spouse name: None    Number of children: None    Years of education: None    Highest education level: None   Tobacco Use    Smoking status: Never   Vaping Use    Vaping Use: Never used   Substance and Sexual Activity    Alcohol use: Not Currently    Drug use: No     Social Determinants of Health     Financial Resource Strain: Low Risk  (1/25/2024)    Financial Resource Strain     Within the past 12 months, have you or your family members you live with been unable to get utilities (heat, electricity) when it was really needed?: No   Food Insecurity: Low Risk  (1/25/2024)    Food Insecurity     Within the past 12 months, did you worry that your food would run out before you got money to buy  "more?: No     Within the past 12 months, did the food you bought just not last and you didn t have money to get more?: No   Transportation Needs: Low Risk  (1/25/2024)    Transportation Needs     Within the past 12 months, has lack of transportation kept you from medical appointments, getting your medicines, non-medical meetings or appointments, work, or from getting things that you need?: No   Interpersonal Safety: Low Risk  (1/25/2024)    Interpersonal Safety     Do you feel physically and emotionally safe where you currently live?: Yes     Within the past 12 months, have you been hit, slapped, kicked or otherwise physically hurt by someone?: No     Within the past 12 months, have you been humiliated or emotionally abused in other ways by your partner or ex-partner?: No   Housing Stability: Low Risk  (1/25/2024)    Housing Stability     Do you have housing? : Yes     Are you worried about losing your housing?: No       Review of Systems:  Skin:          Eyes:  Positive for glasses    ENT:         Respiratory:  Negative       Cardiovascular:  Negative chest pain;Positive for chest heaviness later in the day and into the night for the past 3 days  Gastroenterology:        Genitourinary:         Musculoskeletal:         Neurologic:         Psychiatric:         Heme/Lymph/Imm:         Endocrine:  Negative thyroid disorder;diabetes      Physical Exam:  Vitals: /75   Pulse 78   Ht 1.626 m (5' 4\")   Wt 84.4 kg (186 lb)   BMI 31.93 kg/m      Constitutional:  cooperative, alert and oriented, well developed, well nourished, in no acute distress overweight      Skin:  warm and dry to the touch, no apparent skin lesions or masses noted          Head:  normocephalic, no masses or lesions        Eyes:  pupils equal and round, conjunctivae and lids unremarkable, sclera white, no xanthalasma, EOMS intact, no nystagmus        Lymph:      ENT:  no pallor or cyanosis, dentition good        Neck:  no carotid bruit    "     Respiratory:  normal breath sounds, clear to auscultation, normal A-P diameter, normal symmetry, normal respiratory excursion, no use of accessory muscles         Cardiac: regular rhythm;no murmurs, gallops or rubs detected                pulses full and equal                                        GI:           Extremities and Muscular Skeletal:  no edema;no spinal abnormalities noted;normal muscle strength and tone              Neurological:  no gross motor deficits        Psych:  affect appropriate, oriented to time, person and place        CC  Marixa Rucker MD  [facility]  [address]  [address]  [place] [address]      Thank you for allowing me to participate in the care of your patient.      Sincerely,     Branden Young MD     [facility]

## 2024-02-02 ENCOUNTER — TELEPHONE (OUTPATIENT)
Dept: CARDIOLOGY | Facility: CLINIC | Age: 34
End: 2024-02-02
Payer: COMMERCIAL

## 2024-02-02 DIAGNOSIS — I31.39 PERICARDIAL EFFUSION: Primary | ICD-10-CM

## 2024-02-02 DIAGNOSIS — I30.8 OTHER ACUTE PERICARDITIS: ICD-10-CM

## 2024-02-02 RX ORDER — COLCHICINE 0.6 MG/1
0.6 TABLET ORAL 2 TIMES DAILY
Qty: 180 TABLET | Refills: 1 | Status: SHIPPED | OUTPATIENT
Start: 2024-02-02

## 2024-02-02 NOTE — TELEPHONE ENCOUNTER
Message from patient, she saw Dr. Young for pericarditis on 1/31/2024 with a recommendation to increase her colchicine to 2 tabs daily. She is running out of her previous script and needs an updated refill.    Spoke with patient to review, she started BID dose after the OV. Rx escripted for 90 days.

## 2024-02-07 ENCOUNTER — MYC MEDICAL ADVICE (OUTPATIENT)
Dept: CARDIOLOGY | Facility: CLINIC | Age: 34
End: 2024-02-07
Payer: COMMERCIAL

## 2024-02-07 DIAGNOSIS — I30.8 OTHER ACUTE PERICARDITIS: Primary | ICD-10-CM

## 2024-02-07 DIAGNOSIS — R07.9 CHEST PAIN, UNSPECIFIED TYPE: ICD-10-CM

## 2024-02-07 NOTE — TELEPHONE ENCOUNTER
My Chart message for review - update regarding Pericarditis and medications.     Echo and labs scheduled  KRISTIE OV 3-11-24.     1-31-24 Last Dr. Young OV -   Assessment and plan. Kyra has pericarditis that appears to be responding to the combination of colchicine and ibuprofen. Fortunately ibuprofen is not bothering her stomach. She takes it 3 times a day with meals. I have recommended that she increase her colchicine to twice daily dosing. After 1 week that she cut her ibuprofen down to 400 mg 3 times daily with meals. If after 1 week her symptoms are doing fine then I would cut it down to 200 mg 3 times daily with meals. After another week if asymptomatic I would cut her ibuprofen out altogether. I will have her follow-up with my KRISTIE in about 1 month with a repeat echocardiogram.

## 2024-02-07 NOTE — TELEPHONE ENCOUNTER
My Chart message sent to Patient.     Dr. Young message reply -   No I would not taper until you feel fine and lying flat in bed

## 2024-02-12 RX ORDER — IBUPROFEN 600 MG/1
800 TABLET, FILM COATED ORAL
COMMUNITY
Start: 2024-02-12

## 2024-02-12 NOTE — TELEPHONE ENCOUNTER
Dr Young's reply sent to patient-    Branden Young MD Hiljus, Kayleen IYER, RN  Phone Number: [phone]     I do not think it is the meds.  And yes start to taper decrease ibuprofen to 400 mg 3 times daily and call back in a week          Kyra Santana [facility] Team 2 (supporting Branden Young MD)6 hours ago (9:06 AM)     I looked in the mirror and used my phone light and can see a white spot/sore in the back of my mouth - is that from meds or something else?    Kyra Santana [facility] 2 (supporting Branden Young MD)17 hours ago (10:00 PM)     Hello,     I wanted to give another pericarditis update, I am able to lay flat and symptoms have been a lot better but still slightly there. I have also had a sore throat on and off for about a week and a lot of fatigue - could this be due to the medication?     Do you recommend I start to taper the ibuprofen or wait?     Thank you!

## 2024-02-15 ENCOUNTER — TELEPHONE (OUTPATIENT)
Dept: CARDIOLOGY | Facility: CLINIC | Age: 34
End: 2024-02-15
Payer: COMMERCIAL

## 2024-02-15 NOTE — TELEPHONE ENCOUNTER
Message left to return call for Dr. Young's recommendation.     Dr. Young's reply =   It may be the taper.  We could continue as is to see if her body is getting used to the lower dose.  Otherwise take 400 mg breakfast and lunch and 600 mg with supper

## 2024-02-15 NOTE — TELEPHONE ENCOUNTER
Patient called  - spoke with Patient and also sent My Chart message regarding tapering medications and symptoms last night.  -  I started to taper ibuprofen from 600 mg 3 times to 400mg 3 times per day. I was able to sleep flat for about 3 night but after I started to taper but then had to sleep upright. No pain, just odd weighted feeling and squeeze feeling. Last night around 2am I woke up because I could feel the weighted feeling and it almost felt like an expansion feeling. Then I felt like when I was breathing I wasn t getting enough oxygen and was feeling light headed. My heart rate went from 65 to 139 in a matter of a couple minutes. I then Suddenly needed to use the bathroom (bowel movement). After about 30 min I started to feel better, but it took about 3-4 hours to feel back to normal. Right now (8am) I feel better and weighted feeling and squeezing feeling is gone too. Is this related to anything at all?     Last Dr. Young OV 1-31-24 - pericarditis over the last month   Assessment and plan. Kyra has pericarditis that appears to be responding to the combination of colchicine and ibuprofen. Fortunately ibuprofen is not bothering her stomach. She takes it 3 times a day with meals. I have recommended that she increase her colchicine to twice daily dosing. After 1 week that she cut her ibuprofen down to 400 mg 3 times daily with meals. If after 1 week her symptoms are doing fine then I would cut it down to 200 mg 3 times daily with meals. After another week if asymptomatic I would cut her ibuprofen out altogether. I will have her follow-up with my KRISTIE in about 1 month with a repeat echocardiogram

## 2024-02-16 NOTE — TELEPHONE ENCOUNTER
Called patient to review Dr. Young's recommendation to continue to taper the ibuprofen, but if she is not feeling well can stay at 600mg at bedtime for another week and see how she feels. Patient verbalized understanding and agreed with plan. She notes she felt better last night and will decide tonight if she needs the higher dose for the night.

## 2024-02-17 ENCOUNTER — MYC MEDICAL ADVICE (OUTPATIENT)
Dept: CARDIOLOGY | Facility: CLINIC | Age: 34
End: 2024-02-17
Payer: COMMERCIAL

## 2024-02-19 NOTE — TELEPHONE ENCOUNTER
My chart message from patient:  Farzad Kyra Santana [facility] Team 2  Phone Number: [phone]     Hello,    I m thinking about taking the following vitamins: zinc, vitamin C, vitamin D, K2, Quercitin, NAC    Am I good to take these along with my colchicine and ibuprofen?    Thank you!    [employer] did not find any interactions with zinc, Vit C, Vit D, or K2  Quercitin is not listed - google says it is a plant dye with claims of decreasing inflammation    Will message Dr. Young to review      1220 reply from Dr. Young:  Branden Young MD Anderson, Joana CASEY, RN  Phone Number: [phone]     Thanks for looking it up.  Okay to take    Reply sent to patient:  Ms. Natalie Timothyenma,    Dr. Young reviewed your note and the supplements. He said, OK to take,      Thank you  Team 2 R.N.s  [phone]

## 2024-02-20 ENCOUNTER — MYC MEDICAL ADVICE (OUTPATIENT)
Dept: CARDIOLOGY | Facility: CLINIC | Age: 34
End: 2024-02-20
Payer: COMMERCIAL

## 2024-02-20 NOTE — TELEPHONE ENCOUNTER
My chart message from patient:  Kyra Panda [facility] Heart Team 2  Phone Number: [phone]     Hello,    I woke up today with slightly swollen fingers and slightly swollen left ankle. Yesterday I had discomfort in my heart area all day and had to sleep upright.  Thoughts?    Patient has labs and echo on 3/5/2024  Patient to see KRISTIE Deann Womack on 3/11/2024    Per Dr. Young's dictation 1/31/2024:   Kyra has pericarditis that appears to be responding to the combination of colchicine and ibuprofen.  Fortunately ibuprofen is not bothering her stomach.  She takes it 3 times a day with meals.  I have recommended that she increase her colchicine to twice daily dosing.  After 1 week that she cut her ibuprofen down to 400 mg 3 times daily with meals.  If after 1 week her symptoms are doing fine then I would cut it down to 200 mg 3 times daily with meals.  After another week if asymptomatic I would cut her ibuprofen out altogether.  I will have her follow-up with my KRISTIE in about 1 month with a repeat echocardiogram.     I have also recommended that she get an evaluation through rheumatology but wait until all symptoms have resolved and she is off her medications.        Phone note 2/15/2024: unable to sleep flat with taper    Per Dr. Young: It may be the taper.  We could continue as is to see if her body is getting used to the lower dose.  Otherwise take 400 mg breakfast and lunch and 600 mg with supper     Will message Dr. Young to review

## 2024-02-21 NOTE — TELEPHONE ENCOUNTER
Attempted to call Patient - message eft to return call.     Dr. Young's reply - 2-20-24   If she is having breakthrough pericarditis increase ibuprofen back to 600 mg 3 times daily with meals.  Make sure it is not GI pain from too much nonsteroidals

## 2024-02-22 ENCOUNTER — LAB (OUTPATIENT)
Dept: LAB | Facility: CLINIC | Age: 34
End: 2024-02-22
Payer: COMMERCIAL

## 2024-02-22 DIAGNOSIS — E03.8 SUBCLINICAL HYPOTHYROIDISM: ICD-10-CM

## 2024-02-22 LAB
T4 FREE SERPL-MCNC: 1.27 NG/DL (ref 0.9–1.7)
TSH SERPL DL<=0.005 MIU/L-ACNC: 4.63 UIU/ML (ref 0.3–4.2)

## 2024-02-22 PROCEDURE — 36415 COLL VENOUS BLD VENIPUNCTURE: CPT

## 2024-02-22 PROCEDURE — 86376 MICROSOMAL ANTIBODY EACH: CPT

## 2024-02-22 PROCEDURE — 84439 ASSAY OF FREE THYROXINE: CPT

## 2024-02-22 PROCEDURE — 84443 ASSAY THYROID STIM HORMONE: CPT

## 2024-02-22 NOTE — TELEPHONE ENCOUNTER
Spoke with patient to review Dr. Young's recommendation. She will return to ibuprofen 600mg TID for another week and then see if she can start the taper.

## 2024-02-23 PROBLEM — E03.8 SUBCLINICAL HYPOTHYROIDISM: Status: ACTIVE | Noted: 2024-02-23

## 2024-02-23 LAB — THYROPEROXIDASE AB SERPL-ACNC: <10 IU/ML

## 2024-03-01 ENCOUNTER — MYC MEDICAL ADVICE (OUTPATIENT)
Dept: CARDIOLOGY | Facility: CLINIC | Age: 34
End: 2024-03-01
Payer: COMMERCIAL

## 2024-03-01 DIAGNOSIS — I30.8 OTHER ACUTE PERICARDITIS: Primary | ICD-10-CM

## 2024-03-01 RX ORDER — OMEPRAZOLE 40 MG/1
40 CAPSULE, DELAYED RELEASE ORAL DAILY
Qty: 90 CAPSULE | Refills: 3 | Status: SHIPPED | OUTPATIENT
Start: 2024-03-01 | End: 2024-06-05

## 2024-03-01 NOTE — TELEPHONE ENCOUNTER
Dr Estrada's reply routed to patient via [employer]. Rx sent to pharmacy for omeprazole. Scheduling messaged to try and move up echo to Monday.       Kyra Santana [facility] Heart Team 2 (supporting Branden Young MD)8 hours ago (7:56 AM)     Hello,     I went back up to 600mg 3 times per day for a week, and wanted to give you an update. On Monday the 26th, I took a long hot shower and realized after that my heart rate was 140 and stayed high for a bit. I then went on a very light and slow walk. Afterwards I felt discomfort in my chest and slight sharp pain around 7pm and it last through the night and I had to sleep upright.     On Wednesday the 28th, had bloated stomach around dinner time, and pressure. Also felt slight discomfort in base of throat/esophagus. Had to sleep upright all night.     On Thursday the 29th, still felt the pressure, and could feel it when bending over and lying down, and also base of throat. Afternoon very fatigued. Before dinner felt lethargic and slight car sickness. At night completely upright, and woke up and stayed awake many times due to chest discomfort. It felt slightly better when I would stand up.  Around 4am woke to my heart rate at 90. Finally fell asleep And feel slightly better this morning but discomfort still there.     Thoughts?

## 2024-03-01 NOTE — TELEPHONE ENCOUNTER
She should be on Omeprazole 40 mg daily please with NSAID use. Would suggest getting echo done on Monday to ensure effusion not worse. Check back with patient on Monday on symptoms and connect with primary cardiologist.

## 2024-03-05 ENCOUNTER — HOSPITAL ENCOUNTER (OUTPATIENT)
Dept: CARDIOLOGY | Facility: CLINIC | Age: 34
Discharge: HOME | End: 2024-03-05
Attending: INTERNAL MEDICINE | Admitting: INTERNAL MEDICINE
Payer: COMMERCIAL

## 2024-03-05 DIAGNOSIS — I30.8 OTHER ACUTE PERICARDITIS: ICD-10-CM

## 2024-03-05 DIAGNOSIS — I31.39 PERICARDIAL EFFUSION: ICD-10-CM

## 2024-03-05 LAB — LVEF ECHO: NORMAL

## 2024-03-05 PROCEDURE — 93308 TTE F-UP OR LMTD: CPT | Mod: 26 | Performed by: INTERNAL MEDICINE

## 2024-03-05 PROCEDURE — 93321 DOPPLER ECHO F-UP/LMTD STD: CPT | Mod: 26 | Performed by: INTERNAL MEDICINE

## 2024-03-05 PROCEDURE — 93325 DOPPLER ECHO COLOR FLOW MAPG: CPT | Mod: 26 | Performed by: INTERNAL MEDICINE

## 2024-03-05 PROCEDURE — 93325 DOPPLER ECHO COLOR FLOW MAPG: CPT

## 2024-03-05 NOTE — PROGRESS NOTES
~Cardiology Clinic Visit~    Primary Cardiologist: Dr. Young  Reason for visit: Testing review    History of Present Illness    Kyra Panda is a very pleasant 33 year old female with a past medical history notable for subclinical hypothyroidism, and pericarditis.  She has a family history of lupus and RA in her mother and aunt.  As a child, she had ITP.    In summary, she has been dealing with pericarditis since January 2024.  At that time, she felt that symptoms had started back in December where she had pleuritic type chest pain.  Symptoms worsened and she was evaluated in the ER.  She had elevated D-dimer for which she had a negative CT scan. She was started on ibuprofen for 10 days. This resulted in resolution of her symptoms only to return after she completed her 10-day course. She was then seen in the ER again at which time she was started on colchicine once daily. She had improvement in symptoms but did not have complete resolution. She followed up with her primary care doctor who reinitiated NSAIDs.      Her initial echocardiogram and 1/18/2024 showed an EF of 60 to 65% with trivial pericardial effusion.  EKG showed normal sinus rhythm.    In follow-up at the end of January, she was feeling much better from a symptom standpoint.  At that point, it was felt that her pericarditis was responding well to a combination of colchicine and ibuprofen.  At that point, she was recommended to increase colchicine to twice daily dosing.  In late February, patient called with some chest discomfort stating that she had to sleep upright.  It was recommended that she increase her ibuprofen back up to 600 mg 3 times daily.  It was also suggested that she try PPI for GI prophylaxis during NSAID use.    Diagnostics:    Repeat echocardiogram 03/05/24: Trivial pericardial effusion noted on this study, EF 60-65% with normal LV wall motion and normal valvular function.    Interval 03/11/24    Patient continues  to fluctuate with her symptoms.  She states that over the past 4 days, however, she has felt pretty good.  She is still unable to lay flat at night for sleeping.  On occasion, she has noticed fast, elevated heart rates.  Unfortunately, she has been trying to keep her heart rate under control and her higher heart rates are prevented her from doing much physical activity.  In addition of omeprazole has helped GI symptoms.  She continues on ibuprofen 600 mg 3 times daily and colchicine twice daily.  __________________________________________________________________         Assessment and Impression:     Acute pericarditis trivial   Pericardial effusion  Positional chest pain         Recommendations and Plan:     Continue colchicine and ibuprofen as ordered.  We discussed that backing off on ibuprofen would be the first step in tapering off her current regimen as her symptoms improve.  She was counseled on this and understands.  Start Lopressor 12.5 mg during the day for elevated heart rate.  May take as needed dose in the evening if elevated heart rate persist.  Patient counseled on use and side effects of medication.  Endocrinology referral placed per patient request.  Repeat limited echocardiogram in 1 month  Follow-up with KRISTIE in 1 month for testing and symptom review.  __________________________________________________________________    Thank you for the opportunity to participate in this pleasant patient's care.    We would be happy to see this patient sooner for any concerns in the meantime.    JAZMINE Brown, CNP   Nurse Practitioner  [facility]    Today's clinic visit entailed:  Review of the result(s) of each unique test - cardiac testing, cardiac imaging, labs  The following tests were independently interpreted by me as noted in my documentation: Echocardiogram  Ordering of each unique test  Prescription drug management    The level of medical decision making during this visit  was of moderate complexity.    Orders this Visit:  Orders Placed This Encounter   Procedures    Follow-Up with Cardiology- KRISTIE    Adult Endocrinology [facility] Referral    Echocardiogram Limited     Orders Placed This Encounter   Medications    Multiple Vitamins-Minerals (ZINC PO)    Ascorbic Acid (VITAMIN C PO)    VITAMIN D PO    Menaquinone-7 (K2 PO)    QUERCETIN PO    Acetylcysteine (NAC PO)    metoprolol tartrate (LOPRESSOR) 25 MG tablet     Sig: Take 0.5 tablets (12.5 mg) by mouth daily Take a half-tablet daily.  You may also take an as needed dose in the evening if HR is elevated.     Dispense:  45 tablet     Refill:  1     There are no discontinued medications.  Encounter Diagnoses   Name Primary?    Other acute pericarditis Yes    Pericardial effusion      CURRENT MEDICATIONS:  Current Outpatient Medications   Medication Sig Dispense Refill    Acetylcysteine (NAC PO)       Ascorbic Acid (VITAMIN C PO)       colchicine (COLCRYS) 0.6 MG tablet Take 1 tablet (0.6 mg) by mouth 2 times daily 180 tablet 1    ibuprofen (ADVIL/MOTRIN) 600 MG tablet 600 mg  by mouth three times daily      levonorgestrel (MIRENA) 20 MCG/24HR IUD 1 each (20 mcg) by Intrauterine route once for 1 dose 1 each 0    Menaquinone-7 (K2 PO)       metoprolol tartrate (LOPRESSOR) 25 MG tablet Take 0.5 tablets (12.5 mg) by mouth daily Take a half-tablet daily.  You may also take an as needed dose in the evening if HR is elevated. 45 tablet 1    Multiple Vitamins-Minerals (ZINC PO)       omeprazole (PRILOSEC) 40 MG DR capsule Take 1 capsule (40 mg) by mouth daily 90 capsule 3    QUERCETIN PO       VITAMIN D PO        ALLERGIES   No Known Allergies  PAST MEDICAL, SURGICAL, FAMILY, SOCIAL HISTORY:  History was reviewed and updated as needed, see medical record.    Review of Systems:  A 10-point Review Of Systems is otherwise normal except for that which is noted in the HPI and interval summary.    Physical Exam:    Vitals: /72   Pulse 70    "Ht 1.626 m (5' 4\")   Wt 83 kg (183 lb)   BMI 31.41 kg/m    Constitutional: Appears stated age, well nourished, NAD.  Neck: Supple. Carotid pulses full and equal.   Respiratory: Non-labored. Lungs CTAB.  Cardiovascular: RRR, normal S1 and S2. No M/G/R.  No edema.  GI: Soft, non-distended, non-tender.  Skin: Warm and dry.   Musculoskeletal/Extremities: Symmetrical movement.  Neurologic: No gross focal deficits. Alert, awake.  Psychiatric: Affect appropriate. Mentation normal.    Recent Lab Results:  LIPID RESULTS:  No results found for: \"CHOL\", \"HDL\", \"LDL\", \"TRIG\", \"CHOLHDLRATIO\"  LIVER ENZYME RESULTS:  Lab Results   Component Value Date    AST 28 01/01/2024    AST 31 03/21/2012    ALT 28 01/01/2024    ALT 17 03/21/2012     CBC RESULTS:  Lab Results   Component Value Date    WBC 7.1 01/23/2024    WBC 9.4 03/21/2012    RBC 4.04 01/23/2024    RBC 4.36 03/21/2012    HGB 12.1 01/23/2024    HGB 12.5 03/21/2012    HCT 35.2 01/23/2024    HCT 38.0 03/21/2012    MCV 87 01/23/2024    MCV 87 03/21/2012    MCH 30.0 01/23/2024    MCH 28.7 03/21/2012    MCHC 34.4 01/23/2024    MCHC 32.9 03/21/2012    RDW 12.8 01/23/2024    RDW 14.1 03/21/2012     01/23/2024     03/21/2012     BMP RESULTS:  Lab Results   Component Value Date     01/23/2024     03/21/2012    POTASSIUM 4.2 01/23/2024    POTASSIUM 4.2 03/21/2012    CHLORIDE 103 01/23/2024    CHLORIDE 105 03/21/2012    CO2 27 01/23/2024    CO2 28 03/21/2012    ANIONGAP 8 01/23/2024    ANIONGAP 6 03/21/2012     (H) 01/23/2024    GLC 97 03/21/2012    BUN 13.0 01/23/2024    BUN 15 03/21/2012    CR 0.74 01/23/2024    CR 0.79 03/21/2012    GFRESTIMATED >90 01/23/2024    GFRESTIMATED >90 03/21/2012    GFRESTBLACK >90 03/21/2012    RUBEN 8.9 01/23/2024    RUBEN 9.0 03/21/2012      A1C RESULTS:  No results found for: \"A1C\"  INR RESULTS:  No results found for: \"INR\"                  "

## 2024-03-11 ENCOUNTER — OFFICE VISIT (OUTPATIENT)
Dept: CARDIOLOGY | Facility: CLINIC | Age: 34
End: 2024-03-11
Attending: INTERNAL MEDICINE
Payer: COMMERCIAL

## 2024-03-11 VITALS
SYSTOLIC BLOOD PRESSURE: 112 MMHG | HEART RATE: 70 BPM | DIASTOLIC BLOOD PRESSURE: 72 MMHG | HEIGHT: 64 IN | BODY MASS INDEX: 31.24 KG/M2 | WEIGHT: 183 LBS

## 2024-03-11 DIAGNOSIS — I31.39 PERICARDIAL EFFUSION: ICD-10-CM

## 2024-03-11 DIAGNOSIS — I30.8 OTHER ACUTE PERICARDITIS: Primary | ICD-10-CM

## 2024-03-11 PROCEDURE — 99214 OFFICE O/P EST MOD 30 MIN: CPT | Performed by: NURSE PRACTITIONER

## 2024-03-11 RX ORDER — METOPROLOL TARTRATE 25 MG/1
12.5 TABLET, FILM COATED ORAL DAILY
Qty: 45 TABLET | Refills: 1 | Status: SHIPPED | OUTPATIENT
Start: 2024-03-11 | End: 2024-05-22

## 2024-03-11 NOTE — LETTER
3/11/2024    BENY GIPSON, APRN CNP  [address] [place] [address]    RE: Kyra Aguirreenma       Dear Colleague,     I had the pleasure of seeing Kyra Panda in the [facility].              ~Cardiology Clinic Visit~    Primary Cardiologist: Dr. Young  Reason for visit: Testing review    History of Present Illness    Kyra Panda is a very pleasant 33 year old female with a past medical history notable for subclinical hypothyroidism, and pericarditis.  She has a family history of lupus and RA in her mother and aunt.  As a child, she had ITP.    In summary, she has been dealing with pericarditis since January 2024.  At that time, she felt that symptoms had started back in December where she had pleuritic type chest pain.  Symptoms worsened and she was evaluated in the ER.  She had elevated D-dimer for which she had a negative CT scan. She was started on ibuprofen for 10 days. This resulted in resolution of her symptoms only to return after she completed her 10-day course. She was then seen in the ER again at which time she was started on colchicine once daily. She had improvement in symptoms but did not have complete resolution. She followed up with her primary care doctor who reinitiated NSAIDs.      Her initial echocardiogram and 1/18/2024 showed an EF of 60 to 65% with trivial pericardial effusion.  EKG showed normal sinus rhythm.    In follow-up at the end of January, she was feeling much better from a symptom standpoint.  At that point, it was felt that her pericarditis was responding well to a combination of colchicine and ibuprofen.  At that point, she was recommended to increase colchicine to twice daily dosing.  In late February, patient called with some chest discomfort stating that she had to sleep upright.  It was recommended that she increase her ibuprofen back up to 600 mg 3 times daily.  It was also suggested that she try PPI for GI  prophylaxis during NSAID use.    Diagnostics:    Repeat echocardiogram 03/05/24: Trivial pericardial effusion noted on this study, EF 60-65% with normal LV wall motion and normal valvular function.    Interval 03/11/24    Patient continues to fluctuate with her symptoms.  She states that over the past 4 days, however, she has felt pretty good.  She is still unable to lay flat at night for sleeping.  On occasion, she has noticed fast, elevated heart rates.  Unfortunately, she has been trying to keep her heart rate under control and her higher heart rates are prevented her from doing much physical activity.  In addition of omeprazole has helped GI symptoms.  She continues on ibuprofen 600 mg 3 times daily and colchicine twice daily.  __________________________________________________________________         Assessment and Impression:     Acute pericarditis trivial   Pericardial effusion  Positional chest pain         Recommendations and Plan:     Continue colchicine and ibuprofen as ordered.  We discussed that backing off on ibuprofen would be the first step in tapering off her current regimen as her symptoms improve.  She was counseled on this and understands.  Start Lopressor 12.5 mg during the day for elevated heart rate.  May take as needed dose in the evening if elevated heart rate persist.  Patient counseled on use and side effects of medication.  Endocrinology referral placed per patient request.  Repeat limited echocardiogram in 1 month  Follow-up with KRISTIE in 1 month for testing and symptom review.  __________________________________________________________________    Thank you for the opportunity to participate in this pleasant patient's care.    We would be happy to see this patient sooner for any concerns in the meantime.    JAZMINE Brown, CNP   Nurse Practitioner  [facility]    Today's clinic visit entailed:  Review of the result(s) of each unique test - cardiac testing,  cardiac imaging, labs  The following tests were independently interpreted by me as noted in my documentation: Echocardiogram  Ordering of each unique test  Prescription drug management    The level of medical decision making during this visit was of moderate complexity.    Orders this Visit:  Orders Placed This Encounter   Procedures     Follow-Up with Cardiology- [facility]     Adult Endocrinology [facility] Referral     Echocardiogram Limited     Orders Placed This Encounter   Medications     Multiple Vitamins-Minerals (ZINC PO)     Ascorbic Acid (VITAMIN C PO)     VITAMIN D PO     Menaquinone-7 (K2 PO)     QUERCETIN PO     Acetylcysteine (NAC PO)     metoprolol tartrate (LOPRESSOR) 25 MG tablet     Sig: Take 0.5 tablets (12.5 mg) by mouth daily Take a half-tablet daily.  You may also take an as needed dose in the evening if HR is elevated.     Dispense:  45 tablet     Refill:  1     There are no discontinued medications.  Encounter Diagnoses   Name Primary?     Other acute pericarditis Yes     Pericardial effusion      CURRENT MEDICATIONS:  Current Outpatient Medications   Medication Sig Dispense Refill     Acetylcysteine (NAC PO)        Ascorbic Acid (VITAMIN C PO)        colchicine (COLCRYS) 0.6 MG tablet Take 1 tablet (0.6 mg) by mouth 2 times daily 180 tablet 1     ibuprofen (ADVIL/MOTRIN) 600 MG tablet 600 mg  by mouth three times daily       levonorgestrel (MIRENA) 20 MCG/24HR IUD 1 each (20 mcg) by Intrauterine route once for 1 dose 1 each 0     Menaquinone-7 (K2 PO)        metoprolol tartrate (LOPRESSOR) 25 MG tablet Take 0.5 tablets (12.5 mg) by mouth daily Take a half-tablet daily.  You may also take an as needed dose in the evening if HR is elevated. 45 tablet 1     Multiple Vitamins-Minerals (ZINC PO)        omeprazole (PRILOSEC) 40 MG DR capsule Take 1 capsule (40 mg) by mouth daily 90 capsule 3     QUERCETIN PO        VITAMIN D PO        ALLERGIES   No Known Allergies  PAST MEDICAL, SURGICAL, FAMILY, SOCIAL  "HISTORY:  History was reviewed and updated as needed, see medical record.    Review of Systems:  A 10-point Review Of Systems is otherwise normal except for that which is noted in the HPI and interval summary.    Physical Exam:    Vitals: /72   Pulse 70   Ht 1.626 m (5' 4\")   Wt 83 kg (183 lb)   BMI 31.41 kg/m    Constitutional: Appears stated age, well nourished, NAD.  Neck: Supple. Carotid pulses full and equal.   Respiratory: Non-labored. Lungs CTAB.  Cardiovascular: RRR, normal S1 and S2. No M/G/R.  No edema.  GI: Soft, non-distended, non-tender.  Skin: Warm and dry.   Musculoskeletal/Extremities: Symmetrical movement.  Neurologic: No gross focal deficits. Alert, awake.  Psychiatric: Affect appropriate. Mentation normal.    Recent Lab Results:  LIPID RESULTS:  No results found for: \"CHOL\", \"HDL\", \"LDL\", \"TRIG\", \"CHOLHDLRATIO\"  LIVER ENZYME RESULTS:  Lab Results   Component Value Date    AST 28 01/01/2024    AST 31 03/21/2012    ALT 28 01/01/2024    ALT 17 03/21/2012     CBC RESULTS:  Lab Results   Component Value Date    WBC 7.1 01/23/2024    WBC 9.4 03/21/2012    RBC 4.04 01/23/2024    RBC 4.36 03/21/2012    HGB 12.1 01/23/2024    HGB 12.5 03/21/2012    HCT 35.2 01/23/2024    HCT 38.0 03/21/2012    MCV 87 01/23/2024    MCV 87 03/21/2012    MCH 30.0 01/23/2024    MCH 28.7 03/21/2012    MCHC 34.4 01/23/2024    MCHC 32.9 03/21/2012    RDW 12.8 01/23/2024    RDW 14.1 03/21/2012     01/23/2024     03/21/2012     BMP RESULTS:  Lab Results   Component Value Date     01/23/2024     03/21/2012    POTASSIUM 4.2 01/23/2024    POTASSIUM 4.2 03/21/2012    CHLORIDE 103 01/23/2024    CHLORIDE 105 03/21/2012    CO2 27 01/23/2024    CO2 28 03/21/2012    ANIONGAP 8 01/23/2024    ANIONGAP 6 03/21/2012     (H) 01/23/2024    GLC 97 03/21/2012    BUN 13.0 01/23/2024    BUN 15 03/21/2012    CR 0.74 01/23/2024    CR 0.79 03/21/2012    GFRESTIMATED >90 01/23/2024    GFRESTIMATED >90 03/21/2012 " "   GFRESTBLACK >90 03/21/2012    RUBEN 8.9 01/23/2024    RUBEN 9.0 03/21/2012      A1C RESULTS:  No results found for: \"A1C\"  INR RESULTS:  No results found for: \"INR\"                  Thank you for allowing me to participate in the care of your patient.      Sincerely,     JAZMINE Brown [facility]  cc:   Branden Young MD  [address]  [place] [address]      " Yes...

## 2024-04-25 ENCOUNTER — NURSE TRIAGE (OUTPATIENT)
Dept: PEDIATRICS | Facility: CLINIC | Age: 34
End: 2024-04-25

## 2024-04-25 ENCOUNTER — MYC MEDICAL ADVICE (OUTPATIENT)
Dept: PEDIATRICS | Facility: CLINIC | Age: 34
End: 2024-04-25

## 2024-04-25 ENCOUNTER — OFFICE VISIT (OUTPATIENT)
Dept: PEDIATRICS | Facility: CLINIC | Age: 34
End: 2024-04-25
Payer: COMMERCIAL

## 2024-04-25 VITALS
SYSTOLIC BLOOD PRESSURE: 118 MMHG | HEART RATE: 80 BPM | HEIGHT: 64 IN | TEMPERATURE: 98.1 F | OXYGEN SATURATION: 100 % | WEIGHT: 180 LBS | DIASTOLIC BLOOD PRESSURE: 81 MMHG | RESPIRATION RATE: 12 BRPM | BODY MASS INDEX: 30.73 KG/M2

## 2024-04-25 DIAGNOSIS — E03.8 SUBCLINICAL HYPOTHYROIDISM: ICD-10-CM

## 2024-04-25 DIAGNOSIS — R00.0 TACHYCARDIA: Primary | ICD-10-CM

## 2024-04-25 DIAGNOSIS — R25.1 SHAKINESS: ICD-10-CM

## 2024-04-25 PROCEDURE — 99214 OFFICE O/P EST MOD 30 MIN: CPT | Performed by: PHYSICIAN ASSISTANT

## 2024-04-25 ASSESSMENT — PAIN SCALES - GENERAL: PAINLEVEL: NO PAIN (0)

## 2024-04-25 ASSESSMENT — ANXIETY QUESTIONNAIRES
5. BEING SO RESTLESS THAT IT IS HARD TO SIT STILL: NOT AT ALL
IF YOU CHECKED OFF ANY PROBLEMS ON THIS QUESTIONNAIRE, HOW DIFFICULT HAVE THESE PROBLEMS MADE IT FOR YOU TO DO YOUR WORK, TAKE CARE OF THINGS AT HOME, OR GET ALONG WITH OTHER PEOPLE: NOT DIFFICULT AT ALL
3. WORRYING TOO MUCH ABOUT DIFFERENT THINGS: SEVERAL DAYS
7. FEELING AFRAID AS IF SOMETHING AWFUL MIGHT HAPPEN: SEVERAL DAYS
GAD7 TOTAL SCORE: 2
1. FEELING NERVOUS, ANXIOUS, OR ON EDGE: NOT AT ALL
2. NOT BEING ABLE TO STOP OR CONTROL WORRYING: NOT AT ALL
GAD7 TOTAL SCORE: 2
6. BECOMING EASILY ANNOYED OR IRRITABLE: NOT AT ALL

## 2024-04-25 ASSESSMENT — PATIENT HEALTH QUESTIONNAIRE - PHQ9
SUM OF ALL RESPONSES TO PHQ QUESTIONS 1-9: 4
5. POOR APPETITE OR OVEREATING: NOT AT ALL

## 2024-04-25 NOTE — PROGRESS NOTES
"  Assessment & Plan     Tachycardia    - Cortisol; Future    Shakiness      Subclinical hypothyroidism    - Cortisol; Future      Patient was seen today for symptoms in the night that wake her up.  They consist of shakiness, tachycardia, shortness of breath, feeling thirsty and then the need for a BM.  In review of these symptoms, there was discussion of anxiety or panic.  The patient admits that she has become more aware of her health since her pericarditis and does feel more \"tuned in.\"  In review of her recent visits to the [facility], she has had a lot of lab work that overall looks within normal limits.  We will have the patient get a cortisol level checked in the morning time.  This was ordered.  She was also asked to fill out a GAD7 and PHQ9 to check in on her mental health.  She says that now she is aware of this possible being from anxiety and panic and she will work on calming techniques.  She was advised to followup in about 2 weeks for a recheck and consider treatment or more labs as needed.  Patient understands and agrees with the plan today.          4/25/2024    12:15 PM   SAROJ-7 SCORE   Total Score 2            4/25/2024    12:15 PM   PHQ   PHQ-9 Total Score 4   Q9: Thoughts of better off dead/self-harm past 2 weeks Not at all        Subjective   Kyra is a 33 year old, presenting for the following health issues:  RECHECK        4/25/2024    11:22 AM   Additional Questions   Roomed by KIM Trevino   Accompanied by n/a         4/25/2024    11:22 AM   Patient Reported Additional Medications   Patient reports taking the following new medications n/a     History of Present Illness       Reason for visit:  Recurring symptoms 1-2 times per month since January 24 , waking up in middle of the night gasping for air, high heart rate, have shakes/chills ans feel cold, feel cery thirsty, and then have a bowel movement and feel better after.    She eats 2-3 servings of fruits and vegetables daily.She " "consumes 0 sweetened beverage(s) daily.She exercises with enough effort to increase her heart rate 9 or less minutes per day.  She exercises with enough effort to increase her heart rate 3 or less days per week.   She is taking medications regularly.  CONCERNS: Notices a vein on the left side of the neck when blowing nose or after using the restroom.       Patient is here to address some night time symptoms she has been waking with over the last couple of months.  She says that a couple of times a month, she wakes up in the middle of the night with shakiness, heart racing, shortness of breath and chills.  During these moments, she also feels thirsty and then the need to have a BM and after the BM, things improve and she gets better.  She has not had these episodes before January this year.  Of note, she was found to have pericarditis in December of 2023 and she is still healing from that.  Overall her symptoms have improved and she was even seen for a second opinion at the [facility] pericardial clinic.  She has recently had a lot of lab work done.         Review of Systems  Constitutional, neuro, ENT, endocrine, pulmonary, cardiac, gastrointestinal, genitourinary, musculoskeletal, integument and psychiatric systems are negative, except as otherwise noted.      Objective    /81 (BP Location: Right arm, Patient Position: Sitting, Cuff Size: Adult Large)   Pulse 80   Temp 98.1  F (36.7  C) (Oral)   Resp 12   Ht 1.615 m (5' 3.58\")   Wt 81.6 kg (180 lb)   LMP  (LMP Unknown)   SpO2 100%   Breastfeeding No   BMI 31.30 kg/m    Body mass index is 31.3 kg/m .  Physical Exam   GENERAL: alert and no distress  EYES: Eyes grossly normal to inspection, PERRL and conjunctivae and sclerae normal  NECK: no adenopathy, no asymmetry, masses, or scars  RESP: lungs clear to auscultation - no rales, rhonchi or wheezes  CV: regular rate and rhythm, normal S1 S2, no S3 or S4, no murmur, click or rub, no peripheral " edema  MS: no gross musculoskeletal defects noted, no edema        Signed Electronically by: Rayna Alvarado PA-C

## 2024-04-25 NOTE — TELEPHONE ENCOUNTER
" message sent; RN called to triage.    S-(situation): patient experienced tachycardia, increased RR \"gasping for air\", shivers until bowel movement    B-(background):   -Patient was dx with pericarditis in January.   -Recent check in at [facility] 3/19 states symptoms/pericarditis has greatly improved- echo, cardiac MRI, blood tests.(patient informed them about episode - they recommended she be seen locally).   -This \"episode\" has occurred 2x/month for the last few months.  -Most recent episode night of 4/23    A-(assessment): No current symptoms - current HR 70    Patient states she woke up at 12am \"gasping for air- like I wasn't getting enough oxygen\". HR was 130-140. Denies extra beats/chest pain. Felt heart was racing. Also endorsed shakes/shivering and felt cold. Was very thirsty. Patient went to bathroom to have bowel movement and then symptoms resolved after. This is the exact occurrence that has happened twice monthly.  Denies dizziness/lightheadedness during episode.     Patient has IUD and did have some spotting a few days before this happened- only thing she experienced that was different.    R-(recommendations): Per protocol, patient was scheduled appointment today, 4/25 at 11:30 with 11:10am arrival time. Instructed pt to call back if new or worsening symptoms. Reviewed care advice under care tab with pt . Patient was given an opportunity to ask questions, verbalized understanding of plan, and is agreeable.      Karishma CONWAY RN on 4/25/2024 at 9:20 AM               Reason for Disposition   Heart beating very rapidly (e.g., > 140 / minute) and not present now  (Exception: During exercise.)    Additional Information   Negative: Passed out (i.e., lost consciousness, collapsed and was not responding)   Negative: Shock suspected (e.g., cold/pale/clammy skin, too weak to stand, low BP, rapid pulse)   Negative: Difficult to awaken or acting confused (e.g., disoriented, slurred speech)   Negative: Visible sweat " "on face or sweat dripping down face   Negative: Unable to walk, or can only walk with assistance (e.g., requires support)   Negative: Received SHOCK from implantable cardiac defibrillator and has persisting symptoms (i.e., palpitations, lightheadedness)   Negative: Dizziness, lightheadedness, or weakness and heart beating very rapidly (e.g., > 140 / minute)   Negative: Dizziness, lightheadedness, or weakness and heart beating very slowly (e.g., < 50 / minute)   Negative: Sounds like a life-threatening emergency to the triager   Negative: Chest pain   Negative: Difficulty breathing   Negative: Dizziness, lightheadedness, or weakness   Negative: Heart beating very rapidly (e.g., > 140 / minute) and present now  (Exception: During exercise.)   Negative: Heart beating very slowly (e.g., < 50 / minute)  (Exception: Athlete and heart rate normal for caller.)   Negative: New or worsened shortness of breath with activity (dyspnea on exertion)   Negative: Patient sounds very sick or weak to the triager   Negative: Wearing a 'Holter monitor' or 'cardiac event monitor'   Negative: Received SHOCK from implantable cardiac defibrillator (and now feels well)    Answer Assessment - Initial Assessment Questions  1. DESCRIPTION: \"Please describe your heart rate or heartbeat that you are having\" (e.g., fast/slow, regular/irregular, skipped or extra beats, \"palpitations\")      fast  2. ONSET: \"When did it start?\" (Minutes, hours or days)       Last night for 10 minutes  3. DURATION: \"How long does it last\" (e.g., seconds, minutes, hours)      minutes  4. PATTERN \"Does it come and go, or has it been constant since it started?\"  \"Does it get worse with exertion?\"   \"Are you feeling it now?\"      Intermittent 2x / month  5. TAP: \"Using your hand, can you tap out what you are feeling on a chair or table in front of you, so that I can hear?\" (Note: not all patients can do this)        no  6. HEART RATE: \"Can you tell me your heart rate?\" " "\"How many beats in 15 seconds?\"  (Note: not all patients can do this)        70  7. RECURRENT SYMPTOM: \"Have you ever had this before?\" If Yes, ask: \"When was the last time?\" and \"What happened that time?\"       Couple times  8. CAUSE: \"What do you think is causing the palpitations?\"      unsure  9. CARDIAC HISTORY: \"Do you have any history of heart disease?\" (e.g., heart attack, angina, bypass surgery, angioplasty, arrhythmia)       Pericarditis   10. OTHER SYMPTOMS: \"Do you have any other symptoms?\" (e.g., dizziness, chest pain, sweating, difficulty breathing)        No current symptoms-   Had some gasping for air, shivers  11. PREGNANCY: \"Is there any chance you are pregnant?\" \"When was your last menstrual period?\"        No-    Protocols used: Heart Rate and Heartbeat [id number]    "

## 2024-05-01 ENCOUNTER — LAB (OUTPATIENT)
Dept: LAB | Facility: CLINIC | Age: 34
End: 2024-05-01
Payer: COMMERCIAL

## 2024-05-01 DIAGNOSIS — E03.8 SUBCLINICAL HYPOTHYROIDISM: ICD-10-CM

## 2024-05-01 DIAGNOSIS — R00.0 TACHYCARDIA: ICD-10-CM

## 2024-05-01 LAB — CORTIS SERPL-MCNC: 9.2 UG/DL

## 2024-05-01 PROCEDURE — 82533 TOTAL CORTISOL: CPT

## 2024-05-01 PROCEDURE — 36415 COLL VENOUS BLD VENIPUNCTURE: CPT

## 2024-05-02 NOTE — RESULT ENCOUNTER NOTE
Natalie Rae ,    The results from your recent lab work are within normal limits.    The Cortisol level is within normal limits, which is good news.        Thank you for choosing [facility] for your health care needs,      Rayna Alvarado PA-C

## 2024-05-16 DIAGNOSIS — I31.9 PERICARDITIS: Primary | ICD-10-CM

## 2024-05-19 ENCOUNTER — LAB (OUTPATIENT)
Dept: LAB | Facility: CLINIC | Age: 34
End: 2024-05-19
Payer: COMMERCIAL

## 2024-05-19 DIAGNOSIS — I31.9 PERICARDITIS: ICD-10-CM

## 2024-05-19 LAB
ALT SERPL W P-5'-P-CCNC: 73 U/L (ref 0–50)
ANION GAP SERPL CALCULATED.3IONS-SCNC: 9 MMOL/L (ref 7–15)
AST SERPL W P-5'-P-CCNC: 39 U/L (ref 0–45)
BUN SERPL-MCNC: 6.7 MG/DL (ref 6–20)
CALCIUM SERPL-MCNC: 9.1 MG/DL (ref 8.6–10)
CHLORIDE SERPL-SCNC: 105 MMOL/L (ref 98–107)
CREAT SERPL-MCNC: 0.73 MG/DL (ref 0.51–0.95)
CRP SERPL HS-MCNC: 0.67 MG/L
DEPRECATED HCO3 PLAS-SCNC: 24 MMOL/L (ref 22–29)
EGFRCR SERPLBLD CKD-EPI 2021: >90 ML/MIN/1.73M2
GLUCOSE SERPL-MCNC: 93 MG/DL (ref 70–99)
NT-PROBNP SERPL-MCNC: 119 PG/ML (ref 0–450)
POTASSIUM SERPL-SCNC: 3.8 MMOL/L (ref 3.4–5.3)
SODIUM SERPL-SCNC: 138 MMOL/L (ref 135–145)

## 2024-05-19 PROCEDURE — 86141 C-REACTIVE PROTEIN HS: CPT

## 2024-05-19 PROCEDURE — 80048 BASIC METABOLIC PNL TOTAL CA: CPT

## 2024-05-19 PROCEDURE — 84460 ALANINE AMINO (ALT) (SGPT): CPT

## 2024-05-19 PROCEDURE — 36415 COLL VENOUS BLD VENIPUNCTURE: CPT

## 2024-05-19 PROCEDURE — 83880 ASSAY OF NATRIURETIC PEPTIDE: CPT

## 2024-05-19 PROCEDURE — 84450 TRANSFERASE (AST) (SGOT): CPT

## 2024-05-22 ENCOUNTER — MYC MEDICAL ADVICE (OUTPATIENT)
Dept: PEDIATRICS | Facility: CLINIC | Age: 34
End: 2024-05-22
Payer: COMMERCIAL

## 2024-05-22 NOTE — TELEPHONE ENCOUNTER
Ada,    Please see MC message from pt regarding labs    Thank you  Michael Decker RN on 5/22/2024 at 3:56 PM

## 2024-05-25 ENCOUNTER — HOSPITAL ENCOUNTER (EMERGENCY)
Facility: CLINIC | Age: 34
Discharge: HOME | End: 2024-05-25
Attending: STUDENT IN AN ORGANIZED HEALTH CARE EDUCATION/TRAINING PROGRAM | Admitting: STUDENT IN AN ORGANIZED HEALTH CARE EDUCATION/TRAINING PROGRAM
Payer: COMMERCIAL

## 2024-05-25 ENCOUNTER — APPOINTMENT (OUTPATIENT)
Dept: GENERAL RADIOLOGY | Facility: CLINIC | Age: 34
End: 2024-05-25
Attending: STUDENT IN AN ORGANIZED HEALTH CARE EDUCATION/TRAINING PROGRAM
Payer: COMMERCIAL

## 2024-05-25 VITALS
HEIGHT: 64 IN | HEART RATE: 78 BPM | DIASTOLIC BLOOD PRESSURE: 71 MMHG | OXYGEN SATURATION: 100 % | BODY MASS INDEX: 30.73 KG/M2 | WEIGHT: 180 LBS | RESPIRATION RATE: 18 BRPM | SYSTOLIC BLOOD PRESSURE: 135 MMHG | TEMPERATURE: 98.2 F

## 2024-05-25 DIAGNOSIS — R74.01 TRANSAMINITIS: ICD-10-CM

## 2024-05-25 DIAGNOSIS — R07.9 CHEST PAIN, UNSPECIFIED TYPE: ICD-10-CM

## 2024-05-25 LAB
ALBUMIN SERPL BCG-MCNC: 4.2 G/DL (ref 3.5–5.2)
ALP SERPL-CCNC: 71 U/L (ref 40–150)
ALT SERPL W P-5'-P-CCNC: 111 U/L (ref 0–50)
ANION GAP SERPL CALCULATED.3IONS-SCNC: 9 MMOL/L (ref 7–15)
AST SERPL W P-5'-P-CCNC: 55 U/L (ref 0–45)
BASOPHILS # BLD AUTO: 0.1 10E3/UL (ref 0–0.2)
BASOPHILS NFR BLD AUTO: 1 %
BILIRUB SERPL-MCNC: 0.4 MG/DL
BUN SERPL-MCNC: 9.9 MG/DL (ref 6–20)
CALCIUM SERPL-MCNC: 9.3 MG/DL (ref 8.6–10)
CHLORIDE SERPL-SCNC: 103 MMOL/L (ref 98–107)
CREAT SERPL-MCNC: 0.67 MG/DL (ref 0.51–0.95)
CRP SERPL-MCNC: <3 MG/L
DEPRECATED HCO3 PLAS-SCNC: 25 MMOL/L (ref 22–29)
EGFRCR SERPLBLD CKD-EPI 2021: >90 ML/MIN/1.73M2
EOSINOPHIL # BLD AUTO: 0.1 10E3/UL (ref 0–0.7)
EOSINOPHIL NFR BLD AUTO: 2 %
ERYTHROCYTE [DISTWIDTH] IN BLOOD BY AUTOMATED COUNT: 13.2 % (ref 10–15)
GLUCOSE SERPL-MCNC: 90 MG/DL (ref 70–99)
HCG SERPL QL: NEGATIVE
HCT VFR BLD AUTO: 36.1 % (ref 35–47)
HGB BLD-MCNC: 12 G/DL (ref 11.7–15.7)
HOLD SPECIMEN: NORMAL
IMM GRANULOCYTES # BLD: 0 10E3/UL
IMM GRANULOCYTES NFR BLD: 0 %
LIPASE SERPL-CCNC: 28 U/L (ref 13–60)
LYMPHOCYTES # BLD AUTO: 1.5 10E3/UL (ref 0.8–5.3)
LYMPHOCYTES NFR BLD AUTO: 27 %
MCH RBC QN AUTO: 29.3 PG (ref 26.5–33)
MCHC RBC AUTO-ENTMCNC: 33.2 G/DL (ref 31.5–36.5)
MCV RBC AUTO: 88 FL (ref 78–100)
MONOCYTES # BLD AUTO: 0.4 10E3/UL (ref 0–1.3)
MONOCYTES NFR BLD AUTO: 8 %
NEUTROPHILS # BLD AUTO: 3.3 10E3/UL (ref 1.6–8.3)
NEUTROPHILS NFR BLD AUTO: 62 %
NRBC # BLD AUTO: 0 10E3/UL
NRBC BLD AUTO-RTO: 0 /100
PLATELET # BLD AUTO: 222 10E3/UL (ref 150–450)
POTASSIUM SERPL-SCNC: 3.9 MMOL/L (ref 3.4–5.3)
PROT SERPL-MCNC: 6.6 G/DL (ref 6.4–8.3)
RBC # BLD AUTO: 4.09 10E6/UL (ref 3.8–5.2)
SODIUM SERPL-SCNC: 137 MMOL/L (ref 135–145)
TROPONIN T SERPL HS-MCNC: <6 NG/L
WBC # BLD AUTO: 5.4 10E3/UL (ref 4–11)

## 2024-05-25 PROCEDURE — 99285 EMERGENCY DEPT VISIT HI MDM: CPT | Mod: 25

## 2024-05-25 PROCEDURE — 71046 X-RAY EXAM CHEST 2 VIEWS: CPT

## 2024-05-25 PROCEDURE — 36415 COLL VENOUS BLD VENIPUNCTURE: CPT | Performed by: STUDENT IN AN ORGANIZED HEALTH CARE EDUCATION/TRAINING PROGRAM

## 2024-05-25 PROCEDURE — 93005 ELECTROCARDIOGRAM TRACING: CPT

## 2024-05-25 PROCEDURE — 86140 C-REACTIVE PROTEIN: CPT | Performed by: STUDENT IN AN ORGANIZED HEALTH CARE EDUCATION/TRAINING PROGRAM

## 2024-05-25 PROCEDURE — 84484 ASSAY OF TROPONIN QUANT: CPT | Performed by: STUDENT IN AN ORGANIZED HEALTH CARE EDUCATION/TRAINING PROGRAM

## 2024-05-25 PROCEDURE — 83690 ASSAY OF LIPASE: CPT | Performed by: STUDENT IN AN ORGANIZED HEALTH CARE EDUCATION/TRAINING PROGRAM

## 2024-05-25 PROCEDURE — 84703 CHORIONIC GONADOTROPIN ASSAY: CPT | Performed by: STUDENT IN AN ORGANIZED HEALTH CARE EDUCATION/TRAINING PROGRAM

## 2024-05-25 PROCEDURE — 80053 COMPREHEN METABOLIC PANEL: CPT | Performed by: STUDENT IN AN ORGANIZED HEALTH CARE EDUCATION/TRAINING PROGRAM

## 2024-05-25 PROCEDURE — 85025 COMPLETE CBC W/AUTO DIFF WBC: CPT | Performed by: STUDENT IN AN ORGANIZED HEALTH CARE EDUCATION/TRAINING PROGRAM

## 2024-05-25 PROCEDURE — 250N000013 HC RX MED GY IP 250 OP 250 PS 637: Performed by: STUDENT IN AN ORGANIZED HEALTH CARE EDUCATION/TRAINING PROGRAM

## 2024-05-25 RX ORDER — MAGNESIUM HYDROXIDE/ALUMINUM HYDROXICE/SIMETHICONE 120; 1200; 1200 MG/30ML; MG/30ML; MG/30ML
15 SUSPENSION ORAL ONCE
Status: COMPLETED | OUTPATIENT
Start: 2024-05-25 | End: 2024-05-25

## 2024-05-25 RX ADMIN — ALUMINUM HYDROXIDE, MAGNESIUM HYDROXIDE, AND SIMETHICONE 15 ML: 1200; 120; 1200 SUSPENSION ORAL at 07:04

## 2024-05-25 ASSESSMENT — COLUMBIA-SUICIDE SEVERITY RATING SCALE - C-SSRS
1. IN THE PAST MONTH, HAVE YOU WISHED YOU WERE DEAD OR WISHED YOU COULD GO TO SLEEP AND NOT WAKE UP?: NO
2. HAVE YOU ACTUALLY HAD ANY THOUGHTS OF KILLING YOURSELF IN THE PAST MONTH?: NO
6. HAVE YOU EVER DONE ANYTHING, STARTED TO DO ANYTHING, OR PREPARED TO DO ANYTHING TO END YOUR LIFE?: NO

## 2024-05-25 ASSESSMENT — ACTIVITIES OF DAILY LIVING (ADL)
ADLS_ACUITY_SCORE: 35
ADLS_ACUITY_SCORE: 35

## 2024-05-25 NOTE — ED PROVIDER NOTES
Emergency Department Note      History of Present Illness     Chief Complaint  Chest Pain    HPI  Kyra Panda is a 33 year old female history of ITP as a child, pericarditis in December, presents with concerns of a burning chest discomfort that starts in her lower abdomen and goes up to her throat.  Not exertional.  Worse with lying flat.  Also worse with leaning forward.  Associated nausea but no vomiting.  No diaphoresis.  Not short of breath.  No pleurisy.  No fever or cough or hemoptysis.  No leg pain or swelling.  No hormone use.  No prior history of DVT or PE.  No recent surgery.  No history of cancer.  No tobacco, alcohol, or drug use.  Patient does endorse dietary indiscretions recently.  Is also been increasing her activity more recently.  She is on chronic colchicine and ibuprofen since January.  She was briefly off ibuprofen but then she went on it a few days ago when she started doing increasing activity.    Independent Historian  None    Review of External Notes  April 25, 2024-office visit for recurrent night waking with tachycardia and gasping.  March 21, 2024-cardiology note for evaluation of pericarditis symptoms intermittently since December 2023.  Cardiac MRI with normal pericardium.  Reportedly no evidence of active pericarditis per note.  Past Medical History   Medical History and Problem List  Past Medical History:   Diagnosis Date     ITP (idiopathic thrombocytopenic purpura)        Medications  Acetylcysteine (NAC PO)  Ascorbic Acid (VITAMIN C PO)  colchicine (COLCRYS) 0.6 MG tablet  ibuprofen (ADVIL/MOTRIN) 600 MG tablet  levonorgestrel (MIRENA) 20 MCG/24HR IUD  Menaquinone-7 (K2 PO)  Multiple Vitamins-Minerals (ZINC PO)  omeprazole (PRILOSEC) 40 MG DR capsule  QUERCETIN PO  VITAMIN D PO        Surgical History   History reviewed. No pertinent surgical history.  Physical Exam   Patient Vitals for the past 24 hrs:   BP Temp Temp src Pulse Resp SpO2 Height Weight   05/25/24 0632 -- --  "-- 78 -- 100 % -- --   05/25/24 0631 135/71 98.2  F (36.8  C) Temporal -- 18 -- 1.626 m (5' 4\") 81.6 kg (180 lb)     Physical Exam  GENERAL: Patient well-appearing  HEAD: Atraumatic.  NECK: No rigidity  CV: RRR, no murmurs or gallops.  No friction rub.  PULM: CTAB with good aeration; no retractions, rales, rhonchi, or wheezing  ABD: Soft, nontender, nondistended, no guarding  DERM: No rash. Skin warm and dry  EXTREMITY: Moving all extremities without difficulty. No calf tenderness or peripheral edema  VASCULAR: Symmetric pulses bilaterally    Diagnostics   Lab Results   Labs Ordered and Resulted from Time of ED Arrival to Time of ED Departure   COMPREHENSIVE METABOLIC PANEL - Abnormal       Result Value    Sodium 137      Potassium 3.9      Carbon Dioxide (CO2) 25      Anion Gap 9      Urea Nitrogen 9.9      Creatinine 0.67      GFR Estimate >90      Calcium 9.3      Chloride 103      Glucose 90      Alkaline Phosphatase 71      AST 55 (*)      (*)     Protein Total 6.6      Albumin 4.2      Bilirubin Total 0.4     LIPASE - Normal    Lipase 28     TROPONIN T, HIGH SENSITIVITY - Normal    Troponin T, High Sensitivity <6     CRP INFLAMMATION - Normal    CRP Inflammation <3.00     HCG QUALITATIVE PREGNANCY - Normal    hCG Serum Qualitative Negative     CBC WITH PLATELETS AND DIFFERENTIAL    WBC Count 5.4      RBC Count 4.09      Hemoglobin 12.0      Hematocrit 36.1      MCV 88      MCH 29.3      MCHC 33.2      RDW 13.2      Platelet Count 222      % Neutrophils 62      % Lymphocytes 27      % Monocytes 8      % Eosinophils 2      % Basophils 1      % Immature Granulocytes 0      NRBCs per 100 WBC 0      Absolute Neutrophils 3.3      Absolute Lymphocytes 1.5      Absolute Monocytes 0.4      Absolute Eosinophils 0.1      Absolute Basophils 0.1      Absolute Immature Granulocytes 0.0      Absolute NRBCs 0.0         Imaging  XR Chest 2 Views   Final Result   IMPRESSION: Negative chest.          EKG   ECG interpreted " by me.  Time 0634  NSR at 78. No ST elevation or depression. Normal intervals. Normal axis.   No evidence of WPW, Brugada, HOCM, ARVD, ASD, or Wellen's.         Independent Interpretation  Chest x-ray unremarkable.  ED Course    Medications Administered  Medications   alum & mag hydroxide-simethicone (MAALOX) suspension 15 mL (15 mLs Oral $Given 5/25/24 0741)       Procedures  Procedures     Discussion of Management  None    Social Determinants of Health adding to complexity of care  None    ED Course     Medical Decision Making / Diagnosis        MDM  Patient with nonspecific chest pain.      Chronic conditions complicating -prior pericarditis.    Differential diagnosis considered but not limited to ACS, PE, vascular dissection, but presentation not consistent with these etiologies.    Vital signs unremarkable.  Exam unremarkable.      ECG independently interpreted unremarkable.  Do not see evidence of pericarditis.  I also did a bedside echo and I do not see evidence of an effusion.    Chest x-ray independently interpreted unremarkable.      Labs including CBC, BMP, troponin unremarkable.  CRP also negative.  She does have mild transaminitis which she has had before.  I recommend she follows up with her doctor.    Heart score low risk.  Low risk by Wells, PERC negative, not suggestive of PE. Therefore, considered advanced CT imaging, but not indicated.     Given GI cocktail and she felt improved.  I recommend she stop taking ibuprofen as this could be causing gastritis and reflux.  Discussed dietary modifications.    I have evaluated the patient for acute medical emergencies and have clinically decided no further acute medical interventions are required. Reassessed multiple times and improving. Patient stable for discharge. All questions answered. Given strict return precautions. Additional verbal discharge instructions were given and discussed with the patient. Patient content with plan. The differential  diagnosis and treatment modalities were discussed thoroughly with the patient. Recommended PCP follow-up in 2-3 days.      Disposition  The patient was discharged.     ICD-10 Codes:    ICD-10-CM    1. Chest pain, unspecified type  R07.9       2. Transaminitis  R74.01            Discharge Medications  New Prescriptions    No medications on file         MD Nelli Velásquez Kevin, MD  05/25/24 0749

## 2024-05-25 NOTE — ED TRIAGE NOTES
States HAD PEICARDITIS, WAS ON IBUPROFEN AND COLCHICINE, STATES AROUND 2000 LAST NIGHT DEVELOPED MID STERNAL CHEST PAIN BURNING TYPE PAIN, AND ABDOMINAL PAIN WITH NAUSEA.      Triage Assessment (Adult)       Row Name 05/25/24 0628          Triage Assessment    Airway WDL WDL        Respiratory WDL    Respiratory WDL WDL        Skin Circulation/Temperature WDL    Skin Circulation/Temperature WDL WDL        Cardiac WDL    Cardiac WDL X;chest pain        Chest Pain Assessment    Chest Pain Location midsternal     Character burning        Peripheral/Neurovascular WDL    Peripheral Neurovascular WDL WDL        Cognitive/Neuro/Behavioral WDL    Cognitive/Neuro/Behavioral WDL WDL

## 2024-05-26 ENCOUNTER — HOSPITAL ENCOUNTER (EMERGENCY)
Facility: CLINIC | Age: 34
Discharge: HOME | End: 2024-05-26
Attending: EMERGENCY MEDICINE | Admitting: EMERGENCY MEDICINE
Payer: COMMERCIAL

## 2024-05-26 ENCOUNTER — NURSE TRIAGE (OUTPATIENT)
Dept: NURSING | Facility: CLINIC | Age: 34
End: 2024-05-26
Payer: COMMERCIAL

## 2024-05-26 VITALS
RESPIRATION RATE: 20 BRPM | DIASTOLIC BLOOD PRESSURE: 69 MMHG | OXYGEN SATURATION: 100 % | HEIGHT: 61 IN | TEMPERATURE: 97.7 F | HEART RATE: 88 BPM | SYSTOLIC BLOOD PRESSURE: 124 MMHG | BODY MASS INDEX: 33.99 KG/M2 | WEIGHT: 180 LBS

## 2024-05-26 DIAGNOSIS — K29.70 GASTRITIS WITHOUT BLEEDING, UNSPECIFIED CHRONICITY, UNSPECIFIED GASTRITIS TYPE: ICD-10-CM

## 2024-05-26 DIAGNOSIS — R07.9 CHEST PAIN, UNSPECIFIED TYPE: ICD-10-CM

## 2024-05-26 LAB
ATRIAL RATE - MUSE: 78 BPM
D DIMER PPP FEU-MCNC: <0.27 UG/ML FEU (ref 0–0.5)
DIASTOLIC BLOOD PRESSURE - MUSE: NORMAL MMHG
INTERPRETATION ECG - MUSE: NORMAL
P AXIS - MUSE: 81 DEGREES
PR INTERVAL - MUSE: 168 MS
QRS DURATION - MUSE: 84 MS
QT - MUSE: 406 MS
QTC - MUSE: 462 MS
R AXIS - MUSE: 65 DEGREES
SYSTOLIC BLOOD PRESSURE - MUSE: NORMAL MMHG
T AXIS - MUSE: 58 DEGREES
VENTRICULAR RATE- MUSE: 78 BPM

## 2024-05-26 PROCEDURE — 85379 FIBRIN DEGRADATION QUANT: CPT | Performed by: EMERGENCY MEDICINE

## 2024-05-26 PROCEDURE — 93005 ELECTROCARDIOGRAM TRACING: CPT

## 2024-05-26 PROCEDURE — 99284 EMERGENCY DEPT VISIT MOD MDM: CPT

## 2024-05-26 PROCEDURE — 250N000013 HC RX MED GY IP 250 OP 250 PS 637: Performed by: EMERGENCY MEDICINE

## 2024-05-26 PROCEDURE — 36415 COLL VENOUS BLD VENIPUNCTURE: CPT | Performed by: EMERGENCY MEDICINE

## 2024-05-26 RX ORDER — PANTOPRAZOLE SODIUM 40 MG/1
40 TABLET, DELAYED RELEASE ORAL ONCE
Status: COMPLETED | OUTPATIENT
Start: 2024-05-26 | End: 2024-05-26

## 2024-05-26 RX ORDER — MAGNESIUM HYDROXIDE/ALUMINUM HYDROXICE/SIMETHICONE 120; 1200; 1200 MG/30ML; MG/30ML; MG/30ML
15 SUSPENSION ORAL ONCE
Status: COMPLETED | OUTPATIENT
Start: 2024-05-26 | End: 2024-05-26

## 2024-05-26 RX ORDER — PANTOPRAZOLE SODIUM 40 MG/1
40 TABLET, DELAYED RELEASE ORAL DAILY
Qty: 30 TABLET | Refills: 0 | Status: SHIPPED | OUTPATIENT
Start: 2024-05-26 | End: 2024-06-03

## 2024-05-26 RX ADMIN — PANTOPRAZOLE SODIUM 40 MG: 40 TABLET, DELAYED RELEASE ORAL at 12:31

## 2024-05-26 RX ADMIN — ALUMINUM HYDROXIDE, MAGNESIUM HYDROXIDE, AND SIMETHICONE 15 ML: 1200; 120; 1200 SUSPENSION ORAL at 12:31

## 2024-05-26 ASSESSMENT — COLUMBIA-SUICIDE SEVERITY RATING SCALE - C-SSRS
1. IN THE PAST MONTH, HAVE YOU WISHED YOU WERE DEAD OR WISHED YOU COULD GO TO SLEEP AND NOT WAKE UP?: NO
6. HAVE YOU EVER DONE ANYTHING, STARTED TO DO ANYTHING, OR PREPARED TO DO ANYTHING TO END YOUR LIFE?: NO
2. HAVE YOU ACTUALLY HAD ANY THOUGHTS OF KILLING YOURSELF IN THE PAST MONTH?: NO

## 2024-05-26 ASSESSMENT — ACTIVITIES OF DAILY LIVING (ADL)
ADLS_ACUITY_SCORE: 35
ADLS_ACUITY_SCORE: 35

## 2024-05-26 NOTE — TELEPHONE ENCOUNTER
Nurse Triage SBAR    Is this a 2nd Level Triage? NO    Situation: chest pain    Background: Pericarditis diagnosed January 1, 2024 and is on Colchicine.  Patient was seen in the ED yesterday with unspecified chest pain and transaminitis.    Assessment: Did have one episode of diarrhea after taking Gaviscon this morning, she took 4 spoonfuls.  Patient states that she was eating more high acidic foods and not following her usual diet this past week and that is when her symptoms got worse leading her to be seen in the ED early Saturday morning.  She was given a GI cocktail with reduced symptoms but didn't resolve them completely.    Currently feeling mild-moderate esophagus burning, worse at night.  Chest moderate burning, like breathing in very cold air and is keeping patient awake at night.  No dizziness today but had some on Friday night with some chest tightness/elevated heart rate.  No nausea today, last had Friday night.  Felt generally unwell all weekend.  She is describing a pressure-like chest pain that has been lasting longer than 5 minutes.      Protocol Recommended Disposition:   Call  Now    Recommendation: Recommend patient call 911 and take aspirin 160 mg to 325 mg while waiting for paramedics to arrive.  Patient verbalized understanding information and will head to the ED.         Does the patient meet one of the following criteria for ADS visit consideration? 16+ years old, with an [facility] PCP     TIP  Providers, please consider if this condition is appropriate for management at one of our Acute and Diagnostic Services sites.     If patient is a good candidate, please use dotphrase <dot>triageresponse and select Refer to ADS to document.    Reason for Disposition   Chest pain lasting longer than 5 minutes and ANY of the following:    history of heart disease  (i.e., heart attack, bypass surgery, angina, angioplasty, CHF; not just a heart murmur)    described as crushing, pressure-like, or heavy     age > 50    age > 30 AND at least one cardiac risk factor (i.e., hypertension, diabetes, obesity, smoker or strong family history of heart disease)    not relieved with nitroglycerin    Additional Information   Negative: SEVERE difficulty breathing (e.g., struggling for each breath, speaks in single words)   Negative: Difficult to awaken or acting confused (e.g., disoriented, slurred speech)   Negative: Shock suspected (e.g., cold/pale/clammy skin, too weak to stand, low BP, rapid pulse)   Negative: Passed out (i.e., lost consciousness, collapsed and was not responding)    Protocols used: Chest Pain-A-AH

## 2024-05-26 NOTE — ED NOTES
"Patient is A/O X 4, in stable condition and deemed appropriate for discharge by [facility] provider.  Discharge instructions provided and read to patient.  Patient able to teach back content to this RN.  Spouse present to assist patient with getting home.  Pt up independently.      /69   Pulse 88   Temp 97.7  F (36.5  C) (Temporal)   Resp 20   Ht 1.549 m (5' 1\")   Wt 81.6 kg (180 lb)   LMP  (LMP Unknown)   SpO2 100%   BMI 34.01 kg/m        "

## 2024-05-26 NOTE — ED PROVIDER NOTES
"  Emergency Department Note      History of Present Illness     Chief Complaint  Chest Pain    HPI  Kyra Panda is a 33 year old female with a history of pericarditis who presents with her  for an evaluation of chest pain. The patient states she has been experiencing burning chest pain when breathing in and pressure when bending forward and laying down. She added that she developed epigastric abdominal pain two days ago after eating an unhealthy meal. She then stated she had a little bit of constipation and had diarrhea this morning after eating some prunes last night. She hasn't been able to sleep due to the discomfort in her chest. She noted she hasn't taken her prescription medications yet today. She also noted tapering ibuprofen for 4-5 weeks and finishing about a week ago. She added she was clear for more activity and is worried that her pericarditis has returned.     Independent Historian   affirms the above symptoms.  He confirms some of the dietary changes recently that may have contributed.    Review of External Notes  I reviewed the note from 5/25/24 from Dr. Aiken. Negative cardiac workup at that time. The patient's pain improved with GI cocktail.   Past Medical History   Medical History and Problem List  ITP  IUD (current)  Pericardial effusion   Hypothyroidism     Medications  Colchicine   Omeprazole   Acetylcysteine   Menaquinone-7  Quercetin PO    Surgical History   Seth teeth removal   Physical Exam   Patient Vitals for the past 24 hrs:   BP Temp Temp src Pulse Resp SpO2 Height Weight   05/26/24 1107 131/73 97.7  F (36.5  C) Temporal 90 18 99 % 1.549 m (5' 1\") 81.6 kg (180 lb)     Physical Exam  General:  Sitting on bed.  HENT:  No obvious trauma to head  Right Ear:  External ear normal.   Left Ear:  External ear normal.   Nose:  Nose normal.   Eyes:  Conjunctivae and EOM are normal. Pupils are equal, round, and reactive.   Neck: Normal range of motion. Neck supple. No tracheal " deviation present.   CV:  Normal heart sounds. No murmur heard.  Pulm/Chest: Effort normal and breath sounds normal.   Abd: Soft. No distension. There is no tenderness. There is no rigidity, no rebound and no guarding.   M/S: Normal range of motion.   Neuro: Awake and alert.   Skin: Skin is warm and dry. No rash noted. Not diaphoretic.   Psych: Normal mood and affect. Behavior is normal.    Diagnostics   Lab Results   Labs Ordered and Resulted from Time of ED Arrival to Time of ED Departure   D DIMER QUANTITATIVE - Normal       Result Value    D-Dimer Quantitative <0.27       Imaging  None    EKG   ECG taken at 1116, ECG read at 1118  Normal sinus rhythm    No change as compared to prior, dated 05/28/20.  Rate 87 bpm. MO interval 162 ms. QRS duration 86 ms. QT/QTc 364/438 ms. P-R-T axes 82 75 67.    Independent Interpretation  None  ED Course    Medications Administered  Medications   alum & mag hydroxide-simethicone (MAALOX) suspension 15 mL (15 mLs Oral $Given 5/26/24 1231)   pantoprazole (PROTONIX) EC tablet 40 mg (40 mg Oral $Given 5/26/24 1231)     Procedures  Procedures     Discussion of Management  None    Social Determinants of Health adding to complexity of care  None    ED Course  ED Course as of 05/26/24 1247   Sun May 26, 2024   1205 I obtained history and examined the patient as noted above.    1243 I rechecked and updated the patient. The patient is safe for discharge.      Medical Decision Making / Diagnosis   CMS Diagnoses: None    MIPS     None    MDM  Kyra Panda is a very pleasant 33 year old female Concern of chest pain.  It is in the epigastric region and radiates up.  The patient has a history of pericarditis.  She was seen here yesterday for similar symptoms.  She had an excellent workup at that time by Dr. Aiken that showed no evidence of recurrent pericarditis.  Patient's EKG is repeated today and shows no evidence of pericarditis.  She received a GI cocktail and pantoprazole and feels  better after that.  With the patient's inflammatory markers and troponin being negative yesterday I do not believe those need to be repeated.  I did consider pulmonary embolism and obtained a D-dimer that was found to be negative; therefore, doubt pulmonary embolism.  Lungs are clear bilateral.  There is no wheezing to suggest bronchospasm or coarse breath sounds to suggest pneumonia.  She is afebrile.  She is hemodynamically stable.  I recommended she follow-up with a gastroenterologist to consider outpatient EGD to assess for other conditions such as esophagitis, gastritis, ulcer, Ordoñez's esophagus, H. pylori, celiac, etc.  The patient is in agreement and grateful for this.  Again, she is feeling much better after the GI cocktail and pantoprazole.  I do suspect her symptoms are GI in etiology.  I doubt ACS, non-STEMI, pericarditis, aortic dissection, etc.  She will discontinue the omeprazole and start pantoprazole daily.  Discussed reasons to return.  She and her  are in agreement.    The treatment plan was discussed with the patient and they expressed understanding of this plan and consented to the plan.  In addition, the patient will return to the emergency department if their symptoms persist, worsen, if new symptoms arise or if there is any concern as other pathology may be present that is not evident at this time. They also understand the importance of close follow up in the clinic and if unable to do so will return to the emergency department for a reevaluation. All questions were answered.    Disposition  The patient was discharged.     ICD-10 Codes:    ICD-10-CM    1. Chest pain, unspecified type  R07.9       2. Gastritis without bleeding, unspecified chronicity, unspecified gastritis type  K29.70            Discharge Medications  New Prescriptions    PANTOPRAZOLE (PROTONIX) 40 MG EC TABLET    Take 1 tablet (40 mg) by mouth daily     Scribe Disclosure:  IJenniffer, am serving as a scribe  at 12:15 PM on 5/26/2024 to document services personally performed by Jason Darden DO, based on my observations and the provider's statements to me.     Scribe Disclosure:  I, Taylor Estevez, am serving as a scribe  at 12:44 PM on 5/26/2024 to document services personally performed by Jason Darden DO based on my observations and the provider's statements to me.       Jason Darden DO  05/26/24 1258

## 2024-05-27 LAB
ATRIAL RATE - MUSE: 87 BPM
DIASTOLIC BLOOD PRESSURE - MUSE: NORMAL MMHG
INTERPRETATION ECG - MUSE: NORMAL
P AXIS - MUSE: 82 DEGREES
PR INTERVAL - MUSE: 162 MS
QRS DURATION - MUSE: 86 MS
QT - MUSE: 364 MS
QTC - MUSE: 438 MS
R AXIS - MUSE: 75 DEGREES
SYSTOLIC BLOOD PRESSURE - MUSE: NORMAL MMHG
T AXIS - MUSE: 67 DEGREES
VENTRICULAR RATE- MUSE: 87 BPM

## 2024-05-28 NOTE — TELEPHONE ENCOUNTER
Called and spoke w/ patient. Patient was in ED again. Booked for in-person ER f/unit(s) on Thursday morning.  Karen Harley CMA

## 2024-06-03 ENCOUNTER — VIRTUAL VISIT (OUTPATIENT)
Dept: PEDIATRICS | Facility: CLINIC | Age: 34
End: 2024-06-03
Payer: COMMERCIAL

## 2024-06-03 DIAGNOSIS — I31.9 PERICARDITIS, UNSPECIFIED CHRONICITY, UNSPECIFIED TYPE: Primary | ICD-10-CM

## 2024-06-03 DIAGNOSIS — K29.70 GASTRITIS, PRESENCE OF BLEEDING UNSPECIFIED, UNSPECIFIED CHRONICITY, UNSPECIFIED GASTRITIS TYPE: ICD-10-CM

## 2024-06-03 DIAGNOSIS — R74.8 ELEVATED LIVER ENZYMES: ICD-10-CM

## 2024-06-03 PROCEDURE — 93000 ELECTROCARDIOGRAM COMPLETE: CPT | Performed by: NURSE PRACTITIONER

## 2024-06-03 PROCEDURE — 99443 PR PHYSICIAN TELEPHONE EVALUATION 21-30 MIN: CPT | Mod: 25 | Performed by: NURSE PRACTITIONER

## 2024-06-03 RX ORDER — PANTOPRAZOLE SODIUM 40 MG/1
40 TABLET, DELAYED RELEASE ORAL 2 TIMES DAILY
COMMUNITY
Start: 2024-06-03 | End: 2024-06-05

## 2024-06-03 RX ORDER — TRAZODONE HYDROCHLORIDE 50 MG/1
50 TABLET, FILM COATED ORAL AT BEDTIME
COMMUNITY
Start: 2024-06-03 | End: 2024-07-06

## 2024-06-03 NOTE — PROGRESS NOTES
Kyra is a 33 year old who is being evaluated via a billable telephone visit.      Originating Location (pt. Location): Home    Distant Location (provider location):  Off-site    Assessment & Plan     Pericarditis, unspecified chronicity, unspecified type  Ongoing and unable to wean. Recently hospitalized with tamponade. Feeling the same if not better than on the day of discharge. Given strong FMH autoimmune dz, reasonable to refer to rheum. Basic autoimmune labs have been negative thus far. Pt does have subclinical hypothyroidism.  - EKG 12-lead complete w/read - Clinics  - ESR: Erythrocyte sedimentation rate; Future  - CRP, inflammation; Future  - Comprehensive metabolic panel (BMP + Alb, Alk Phos, ALT, AST, Total. Bili, TP); Future  - Adult Rheumatology  Referral; Future  - BNP-N terminal pro; Future  -Continue to follow with [facility]. Discussed reasons to seek care urgently.       Elevated liver enzymes  Unclear etiology. [facility] did a liver doppler but didn't appear to have evaluated the parenchyma. Zero ETOH. Given FMH autoimmune dz, will add on a few autoimmune liver labs. Also, pt thinks a great aunt had alpha 1 antitrypsin and thinks father may have this as well  - US Abdomen Limited; Future  - Comprehensive metabolic panel (BMP + Alb, Alk Phos, ALT, AST, Total. Bili, TP); Future  - Iron and iron binding capacity; Future  - Hepatic panel (Albumin, ALT, AST, Bili, Alk Phos, TP); Future  - Tissue transglutaminase jcarlos IgA and IgG; Future  - IgA; Future  - Anti Nuclear Jcarlos IgG by IFA with Reflex; Future  - F Actin EIA with reflex; Future  - TSH with free T4 reflex; Future  - Tissue transglutaminase jcarlos IgA and IgG; Future  - IgA; Future  - Alpha 1 Antitrypsin; Future  -Ask dad to find out the name of his liver disease asap  -Avoid tylenol/ETOH  -STOP all suplements (NAC, quercitin, etc)  -Recheck now and again in 1 month, possibly sooner depending on results    Gastritis, presence of bleeding unspecified,  unspecified chronicity, unspecified gastritis type  Unclear if having true gastritis symptoms or if its more referred pain from pericarditis. High dose, prolonged NSAIDs certainly put her at risk. In addition, at one point a GI cocktail in the ED resolved the symptoms. However, reports recently re-starting the ibuprofen (for pericarditis) resolved her stomach pain (but stomach pain had been improving at that time). Sx are stable. However, need urgent EGD to assess whether ongoing stomach pain is gastric, especially that she will likely be on NSAIDs for quite awhile. Could consider carafate if having gastritis.   - Adult GI  Referral - Procedure Only; Future  -Continue BID protonix  -Urgent endoscopy              Subjective     [facility] Scheduling [phone]    Pericarditis January.  Started ibuprofen and colchicine  1/31: Cards increased colchicine. Felt better. Started tapering ibuprofen a week later. Re-started ibuprofen  Feb: [facility] sore throat. Started on antacid.  2/13: taper Ibuprofen.   Tapered off of ibuprofen. Developed sx again. Re-started higher doses.    3/2: Omeprazole  3/11: Cardiology.   3/20 Saw [facility]. Did MRI. Echo. Gave her a taper plan.   Stomach hurting. Constipation/diarrhea    5/12: Started ibuprofen taper. Last ibuprofen dose 5/16.    5/25: Chest pain, transaminitis. Stomach pain  5/26: Ed again. Admited.  At the time of the ED visit  Chest worse  Stomach better  Started ibuprofen-stomach got better on ibuprofen  Kyra is a 33 year old, presenting for the following health issues:  No chief complaint on file.    HPI                 Objective           Vitals:  No vitals were obtained today due to virtual visit.    Physical Exam   General: Alert and no distress //Respiratory: No audible wheeze, cough, or shortness of breath // Psychiatric:  Appropriate affect, tone, and pace of words            Phone call duration: 25 minutes, plus an additional 30 in chart review and  coordination of careThe longitudinal plan of care for the diagnosis(es)/condition(s) as documented were addressed during this visit. Due to the added complexity in care, I will continue to support Kyra in the subsequent management and with ongoing continuity of care.  Signed Electronically by: JAZMINE HATFIELD CNP

## 2024-06-04 ENCOUNTER — NURSE TRIAGE (OUTPATIENT)
Dept: PEDIATRICS | Facility: CLINIC | Age: 34
End: 2024-06-04
Payer: COMMERCIAL

## 2024-06-04 NOTE — TELEPHONE ENCOUNTER
"Reason for Disposition   MILD SWELLING of abdomen (e.g., looks distended or swollen) that is NEW-ONSET or getting worse    Additional Information   Negative: Sounds like a life-threatening emergency to the triager   Negative: Chest pain   Negative: Menstrual cramps with bloating are main symptoms   Negative: Abdomen pain is main symptom and female   Negative: Abdomen pain is main symptom and male   Negative: Breathing difficulty is main symptom   Negative: Constipation is main symptom   Negative: Diarrhea is main symptom   Negative: Vomiting is main symptom   Negative: MODERATE - SEVERE SWELLING of abdomen (e.g., looks very distended or swollen) that is NEW-ONSET or much worse   Negative: Blood in bowel movements  (Exception: Blood on surface of BM with constipation.)   Negative: Black or tarry bowel movements (Exception: Chronic-unchanged black-grey BMs AND is taking iron pills or Pepto-Bismol.)   Negative: MILD SWELLING of abdomen (e.g., looks distended or swollen) that is NEW-ONSET or getting worse and fever   Negative: MILD TO MODERATE constant pain lasting > 2 hours   Negative: Vomiting contains bile (green color)   Negative: MILD pain (e.g., does not interfere with normal activities) and pain comes and goes (cramps) lasts > 48 hours  (Exception: This same abdominal pain is a chronic symptom recurrent or ongoing AND present > 4 weeks.)   Negative: White of the eyes have turned yellow (i.e., jaundice)   Negative: Patient wants to be seen    Answer Assessment - Initial Assessment Questions  1. SYMPTOM: \"What's the main symptom you're concerned about?\" (e.g., abdomen bloating, swelling)      Bloating   2. ONSET: \"When did bloating  start?\"      A few days ago- Friday  3. SEVERITY: \"How bad is the bloating or swelling?\"     - BLOATING: Feels gassy or bloated. No visible swelling.      - MILD SWELLING: Feels gassy or bloated. Abdomen looks mildly distended or swollen.     - MODERATE - SEVERE SWELLING: Abdomen looks " "very distended or swollen.       Mild   4. ABDOMEN PAIN:  \"Is there any abdomen pain?\" If Yes, ask: \"How bad is the pain?\"  (e.g., Scale 1-10; mild, moderate, or severe)    - NONE (0): No pain.    - MILD (1-3): Doesn't interfere with normal activities, abdomen soft and not tender to touch.     - MODERATE (4-7): Interferes with normal activities or awakens from sleep, abdomen tender to touch.     - SEVERE (8-10): Excruciating pain, doubled over, unable to do any normal activities.        No- slight feeling on (R) side by belly button a couple of times  5. RELIEVING AND AGGRAVATING FACTORS: \"What makes it better or worse?\" (e.g., certain foods, lactose, medicines)      A little worse after eating and later in the day   6. GI HISTORY: \"Do you have any history of stomach or intestine problems?\" (e.g., bowel obstruction, cancer, irritable bowel)       No  7. CAUSE: \"What do you think is causing the bloating?\"       Recent hospitalization for pericarditis  8. OTHER SYMPTOMS: \"Do you have any other symptoms?\" (e.g., belching, blood in stool, breathing difficulty, constipation, diarrhea, fever, passing gas, vomiting, weight loss, white of eyes have turned yellow)      Slight constipation  9. PREGNANCY: \"Is there any chance you are pregnant?\" \"When was your last menstrual period?\"      No    Protocols used: Abdomen Bloating and Swelling-A-OH    "

## 2024-06-05 ENCOUNTER — ANCILLARY PROCEDURE (OUTPATIENT)
Dept: GENERAL RADIOLOGY | Facility: CLINIC | Age: 34
End: 2024-06-05
Attending: PHYSICIAN ASSISTANT
Payer: COMMERCIAL

## 2024-06-05 ENCOUNTER — OFFICE VISIT (OUTPATIENT)
Dept: PEDIATRICS | Facility: CLINIC | Age: 34
End: 2024-06-05
Payer: COMMERCIAL

## 2024-06-05 VITALS
SYSTOLIC BLOOD PRESSURE: 114 MMHG | TEMPERATURE: 97.5 F | WEIGHT: 181 LBS | OXYGEN SATURATION: 98 % | DIASTOLIC BLOOD PRESSURE: 64 MMHG | RESPIRATION RATE: 20 BRPM | BODY MASS INDEX: 34.17 KG/M2 | HEART RATE: 67 BPM | HEIGHT: 61 IN

## 2024-06-05 DIAGNOSIS — I31.9 PERICARDITIS, UNSPECIFIED CHRONICITY, UNSPECIFIED TYPE: ICD-10-CM

## 2024-06-05 DIAGNOSIS — R14.0 ABDOMINAL DISTENSION: Primary | ICD-10-CM

## 2024-06-05 DIAGNOSIS — R14.0 ABDOMINAL DISTENSION: ICD-10-CM

## 2024-06-05 DIAGNOSIS — R74.8 ELEVATED LIVER ENZYMES: ICD-10-CM

## 2024-06-05 LAB
ALBUMIN UR-MCNC: NEGATIVE MG/DL
APPEARANCE UR: CLEAR
BASOPHILS # BLD AUTO: 0 10E3/UL (ref 0–0.2)
BASOPHILS NFR BLD AUTO: 1 %
BILIRUB UR QL STRIP: NEGATIVE
COLOR UR AUTO: YELLOW
EOSINOPHIL # BLD AUTO: 0.2 10E3/UL (ref 0–0.7)
EOSINOPHIL NFR BLD AUTO: 5 %
ERYTHROCYTE [DISTWIDTH] IN BLOOD BY AUTOMATED COUNT: 13 % (ref 10–15)
ERYTHROCYTE [SEDIMENTATION RATE] IN BLOOD BY WESTERGREN METHOD: 11 MM/HR (ref 0–20)
GLUCOSE UR STRIP-MCNC: NEGATIVE MG/DL
HCT VFR BLD AUTO: 35.7 % (ref 35–47)
HGB BLD-MCNC: 11.9 G/DL (ref 11.7–15.7)
HGB UR QL STRIP: NEGATIVE
IMM GRANULOCYTES # BLD: 0 10E3/UL
IMM GRANULOCYTES NFR BLD: 0 %
KETONES UR STRIP-MCNC: NEGATIVE MG/DL
LEUKOCYTE ESTERASE UR QL STRIP: NEGATIVE
LYMPHOCYTES # BLD AUTO: 1.2 10E3/UL (ref 0.8–5.3)
LYMPHOCYTES NFR BLD AUTO: 27 %
MCH RBC QN AUTO: 30.4 PG (ref 26.5–33)
MCHC RBC AUTO-ENTMCNC: 33.3 G/DL (ref 31.5–36.5)
MCV RBC AUTO: 91 FL (ref 78–100)
MONOCYTES # BLD AUTO: 0.4 10E3/UL (ref 0–1.3)
MONOCYTES NFR BLD AUTO: 8 %
NEUTROPHILS # BLD AUTO: 2.7 10E3/UL (ref 1.6–8.3)
NEUTROPHILS NFR BLD AUTO: 60 %
NITRATE UR QL: NEGATIVE
PH UR STRIP: 6 [PH] (ref 5–7)
PLATELET # BLD AUTO: 269 10E3/UL (ref 150–450)
RBC # BLD AUTO: 3.92 10E6/UL (ref 3.8–5.2)
SP GR UR STRIP: 1.01 (ref 1–1.03)
UROBILINOGEN UR STRIP-ACNC: 0.2 E.U./DL
WBC # BLD AUTO: 4.5 10E3/UL (ref 4–11)

## 2024-06-05 PROCEDURE — 86665 EPSTEIN-BARR CAPSID VCA: CPT | Performed by: PHYSICIAN ASSISTANT

## 2024-06-05 PROCEDURE — 82104 ALPHA-1-ANTITRYPSIN PHENO: CPT | Mod: 90 | Performed by: PHYSICIAN ASSISTANT

## 2024-06-05 PROCEDURE — 86038 ANTINUCLEAR ANTIBODIES: CPT | Performed by: PHYSICIAN ASSISTANT

## 2024-06-05 PROCEDURE — 85652 RBC SED RATE AUTOMATED: CPT | Performed by: PHYSICIAN ASSISTANT

## 2024-06-05 PROCEDURE — 82103 ALPHA-1-ANTITRYPSIN TOTAL: CPT | Mod: 90 | Performed by: PHYSICIAN ASSISTANT

## 2024-06-05 PROCEDURE — 86644 CMV ANTIBODY: CPT | Performed by: PHYSICIAN ASSISTANT

## 2024-06-05 PROCEDURE — 93000 ELECTROCARDIOGRAM COMPLETE: CPT | Performed by: PHYSICIAN ASSISTANT

## 2024-06-05 PROCEDURE — 83690 ASSAY OF LIPASE: CPT | Performed by: PHYSICIAN ASSISTANT

## 2024-06-05 PROCEDURE — 84443 ASSAY THYROID STIM HORMONE: CPT | Performed by: PHYSICIAN ASSISTANT

## 2024-06-05 PROCEDURE — 99000 SPECIMEN HANDLING OFFICE-LAB: CPT | Performed by: PHYSICIAN ASSISTANT

## 2024-06-05 PROCEDURE — 74018 RADEX ABDOMEN 1 VIEW: CPT | Mod: TC | Performed by: RADIOLOGY

## 2024-06-05 PROCEDURE — 36415 COLL VENOUS BLD VENIPUNCTURE: CPT | Performed by: PHYSICIAN ASSISTANT

## 2024-06-05 PROCEDURE — 82784 ASSAY IGA/IGD/IGG/IGM EACH: CPT | Performed by: PHYSICIAN ASSISTANT

## 2024-06-05 PROCEDURE — 83516 IMMUNOASSAY NONANTIBODY: CPT | Mod: 90 | Performed by: PHYSICIAN ASSISTANT

## 2024-06-05 PROCEDURE — 86140 C-REACTIVE PROTEIN: CPT | Performed by: PHYSICIAN ASSISTANT

## 2024-06-05 PROCEDURE — 86364 TISS TRNSGLTMNASE EA IG CLAS: CPT | Performed by: PHYSICIAN ASSISTANT

## 2024-06-05 PROCEDURE — 85025 COMPLETE CBC W/AUTO DIFF WBC: CPT | Performed by: PHYSICIAN ASSISTANT

## 2024-06-05 PROCEDURE — 81003 URINALYSIS AUTO W/O SCOPE: CPT | Performed by: PHYSICIAN ASSISTANT

## 2024-06-05 PROCEDURE — 83880 ASSAY OF NATRIURETIC PEPTIDE: CPT | Performed by: PHYSICIAN ASSISTANT

## 2024-06-05 PROCEDURE — 83550 IRON BINDING TEST: CPT | Performed by: PHYSICIAN ASSISTANT

## 2024-06-05 PROCEDURE — 82248 BILIRUBIN DIRECT: CPT | Performed by: PHYSICIAN ASSISTANT

## 2024-06-05 PROCEDURE — 83540 ASSAY OF IRON: CPT | Performed by: PHYSICIAN ASSISTANT

## 2024-06-05 PROCEDURE — 80053 COMPREHEN METABOLIC PANEL: CPT | Performed by: PHYSICIAN ASSISTANT

## 2024-06-05 PROCEDURE — 99214 OFFICE O/P EST MOD 30 MIN: CPT | Performed by: PHYSICIAN ASSISTANT

## 2024-06-05 PROCEDURE — 86645 CMV ANTIBODY IGM: CPT | Performed by: PHYSICIAN ASSISTANT

## 2024-06-05 PROCEDURE — 82150 ASSAY OF AMYLASE: CPT | Performed by: PHYSICIAN ASSISTANT

## 2024-06-05 RX ORDER — OMEPRAZOLE 40 MG/1
40 CAPSULE, DELAYED RELEASE ORAL 2 TIMES DAILY
Qty: 90 CAPSULE | Refills: 3 | Status: SHIPPED | OUTPATIENT
Start: 2024-06-05

## 2024-06-05 ASSESSMENT — PAIN SCALES - GENERAL: PAINLEVEL: NO PAIN (0)

## 2024-06-05 NOTE — PROGRESS NOTES
Assessment & Plan     Abdominal distension  Obtain xray and labs for further evaluation.  Xray reveals constipation. Patient to begin miralax daily.   - CBC with platelets and differential; Future  - Comprehensive metabolic panel (BMP + Alb, Alk Phos, ALT, AST, Total. Bili, TP); Future  - Lipase; Future  - Amylase; Future  - UA Macroscopic with reflex to Microscopic and Culture - Lab Collect; Future  - CBC with platelets and differential  - Lipase  - Amylase  - UA Macroscopic with reflex to Microscopic and Culture - Lab Collect  - XR KUB; Future    Elevated liver enzymes  Labs pending.   - Alpha 1 Antitrypsin  - TSH with free T4 reflex  - F Actin EIA with reflex  - Anti Nuclear Jcarlos IgG by IFA with Reflex  - Iron and iron binding capacity  - CMV Antibody IgG; Future  - CMV antibody IgM; Future  - EBV Capsid Antibody IgM; Future  - EBV Capsid Antibody IgG; Future  - CMV Antibody IgG  - CMV antibody IgM  - EBV Capsid Antibody IgM  - EBV Capsid Antibody IgG  - Comprehensive metabolic panel (BMP + Alb, Alk Phos, ALT, AST, Total. Bili, TP)  - Tissue transglutaminase jcarlos IgA and IgG  - IgA  - Bilirubin direct    Pericarditis, unspecified chronicity, unspecified type  - BNP-N terminal pro  - CRP, inflammation  - ESR: Erythrocyte sedimentation rate  - EKG 12-lead complete w/read - Clinics  - Comprehensive metabolic panel (BMP + Alb, Alk Phos, ALT, AST, Total. Bili, TP); Future  - Comprehensive metabolic panel (BMP + Alb, Alk Phos, ALT, AST, Total. Bili, TP)      Dee Rae is a 33 year old, presenting for the following health issues:  Bloated  History of Present Illness       Reason for visit:  Bloating  Symptom onset:  3-7 days ago  Symptoms include:  Bloating worse in the late afternoon and on.  Off and On a feeling RQ abdomen  Symptom intensity:  Moderate  Symptom progression:  Worsening  Had these symptoms before:  No  What makes it worse:  After eating and at night.  Feels better in the morning.  Having BM  "normal  What makes it better:  Gas ex helps very little, fiber not helpful    She eats 2-3 servings of fruits and vegetables daily.She consumes 0 sweetened beverage(s) daily.She exercises with enough effort to increase her heart rate 9 or less minutes per day.  She exercises with enough effort to increase her heart rate 3 or less days per week.   She is taking medications regularly.     Pain History:Where in your body do you have pain? Abdominal discomfort x 6 days  Description:   Character: everywhere  Location: generalized  Radiation: None  Intensity: moderate  Progression of Symptoms:  same  Accompanying Signs & Symptoms:  Fever/Chills: No  Gas/Bloating: YES  Nausea: No  Vomitting: No  Diarrhea: No  Constipation: yes--BM every AM although not the usual  Dysuria or Hematuria: No  History:   Trauma: No  Previous similar pain: No  Previous tests done: none  US abd for eval of elevated liver tests  Precipitating factors:   Does the pain change with:   Better in AMs.   Worse after 2 pm    Food: No    Bowel Movement: No    Urination: No   Other factors:  No  Therapies tried and outcome: None  No LMP recorded (lmp unknown). (Menstrual status: IUD).  Pericarditis with recent flare. Patient in ED. On high dose ibu and colchine. On PPI.  Elevated LFTs--labs completed. Abd US tomorrow.  Gastritis? [facility] appointment next week. Needs EGD for further eval.  New onset bloating.     No history of pancreatitis, ulcers, IBS, IBD, diverticulitis, appendicitis.     Review of Systems  Constitutional, HEENT, cardiovascular, pulmonary, gi and gu systems are negative, except as otherwise noted.      Objective    BP 96/54   Pulse 67   Temp 97.5  F (36.4  C)   Resp 20   Ht 1.549 m (5' 1\")   Wt 82.1 kg (181 lb)   LMP  (LMP Unknown)   SpO2 98%   BMI 34.20 kg/m    Body mass index is 34.2 kg/m .  Physical Exam   GENERAL: alert and no distress  EYES: Eyes grossly normal to inspection, PERRL and conjunctivae and sclerae normal  HENT: ear " canals and TM's normal, nose and mouth without ulcers or lesions  NECK: no adenopathy, no asymmetry, masses, or scars  RESP: lungs clear to auscultation - no rales, rhonchi or wheezes  CV: regular rate and rhythm, normal S1 S2, no S3 or S4, no murmur, click or rub, no peripheral edema  ABDOMEN: soft, diffuse abd distention. No abd tenderness, no hepatosplenomegaly, no masses and bowel sounds normal  MS: no gross musculoskeletal defects noted, no edema    Results for orders placed or performed in visit on 06/05/24   CBC with platelets and differential     Status: None ()    Narrative    The following orders were created for panel order CBC with platelets and differential.  Procedure                               Abnormality         Status                     ---------                               -----------         ------                     CBC with platelets and d...[[id number]]                                                   Please view results for these tests on the individual orders.           Signed Electronically by: Angela Wu PA-C

## 2024-06-06 ENCOUNTER — HOSPITAL ENCOUNTER (OUTPATIENT)
Dept: ULTRASOUND IMAGING | Facility: CLINIC | Age: 34
Discharge: HOME | End: 2024-06-06
Attending: NURSE PRACTITIONER | Admitting: NURSE PRACTITIONER
Payer: COMMERCIAL

## 2024-06-06 DIAGNOSIS — R74.8 ELEVATED LIVER ENZYMES: ICD-10-CM

## 2024-06-06 LAB
ALBUMIN SERPL BCG-MCNC: 4.3 G/DL (ref 3.5–5.2)
ALP SERPL-CCNC: 84 U/L (ref 40–150)
ALT SERPL W P-5'-P-CCNC: 74 U/L (ref 0–50)
AMYLASE SERPL-CCNC: 72 U/L (ref 28–100)
ANA SER QL IF: NEGATIVE
ANION GAP SERPL CALCULATED.3IONS-SCNC: 11 MMOL/L (ref 7–15)
AST SERPL W P-5'-P-CCNC: 26 U/L (ref 0–45)
BILIRUB DIRECT SERPL-MCNC: <0.2 MG/DL (ref 0–0.3)
BILIRUB SERPL-MCNC: 0.4 MG/DL
BUN SERPL-MCNC: 15.3 MG/DL (ref 6–20)
CALCIUM SERPL-MCNC: 9 MG/DL (ref 8.6–10)
CHLORIDE SERPL-SCNC: 102 MMOL/L (ref 98–107)
CMV IGG SERPL IA-ACNC: >10 U/ML
CMV IGG SERPL IA-ACNC: ABNORMAL
CREAT SERPL-MCNC: 0.95 MG/DL (ref 0.51–0.95)
CRP SERPL-MCNC: <3 MG/L
DEPRECATED HCO3 PLAS-SCNC: 23 MMOL/L (ref 22–29)
EBV VCA IGG SER IA-ACNC: 175 U/ML
EBV VCA IGG SER IA-ACNC: POSITIVE
EGFRCR SERPLBLD CKD-EPI 2021: 81 ML/MIN/1.73M2
GLUCOSE SERPL-MCNC: 80 MG/DL (ref 70–99)
IGA SERPL-MCNC: 182 MG/DL (ref 84–499)
IRON BINDING CAPACITY (ROCHE): 284 UG/DL (ref 240–430)
IRON SATN MFR SERPL: 24 % (ref 15–46)
IRON SERPL-MCNC: 69 UG/DL (ref 37–145)
LIPASE SERPL-CCNC: 32 U/L (ref 13–60)
NT-PROBNP SERPL-MCNC: 48 PG/ML (ref 0–450)
POTASSIUM SERPL-SCNC: 3.9 MMOL/L (ref 3.4–5.3)
PROT SERPL-MCNC: 6.9 G/DL (ref 6.4–8.3)
SODIUM SERPL-SCNC: 136 MMOL/L (ref 135–145)
TSH SERPL DL<=0.005 MIU/L-ACNC: 3.71 UIU/ML (ref 0.3–4.2)
TTG IGA SER-ACNC: 0.2 U/ML
TTG IGG SER-ACNC: <0.6 U/ML

## 2024-06-06 PROCEDURE — 76705 ECHO EXAM OF ABDOMEN: CPT

## 2024-06-07 ENCOUNTER — MYC MEDICAL ADVICE (OUTPATIENT)
Dept: PEDIATRICS | Facility: CLINIC | Age: 34
End: 2024-06-07
Payer: COMMERCIAL

## 2024-06-07 LAB
CMV IGM SERPL IA-ACNC: <8 AU/ML
CMV IGM SERPL IA-ACNC: NEGATIVE
EBV VCA IGM SER IA-ACNC: 10.9 U/ML
EBV VCA IGM SER IA-ACNC: NORMAL
SMA IGG SER-ACNC: 5 UNITS

## 2024-06-07 NOTE — TELEPHONE ENCOUNTER
It is currently active if the IgM values are POSITIVE. You have antibodies (IgG values) for both types of mono. It would be unlikely that you would have these infections In the future.   Angela Wu PA-C

## 2024-06-08 LAB
A1AT PHENOTYP SERPL-IMP: NORMAL
A1AT SERPL-MCNC: 172 MG/DL

## 2024-06-12 ENCOUNTER — MYC MEDICAL ADVICE (OUTPATIENT)
Dept: PEDIATRICS | Facility: CLINIC | Age: 34
End: 2024-06-12
Payer: COMMERCIAL

## 2024-07-02 ENCOUNTER — MYC MEDICAL ADVICE (OUTPATIENT)
Dept: PEDIATRICS | Facility: CLINIC | Age: 34
End: 2024-07-02
Payer: COMMERCIAL

## 2024-07-03 ENCOUNTER — OFFICE VISIT (OUTPATIENT)
Dept: PEDIATRICS | Facility: CLINIC | Age: 34
End: 2024-07-03
Payer: COMMERCIAL

## 2024-07-03 ENCOUNTER — TRANSFERRED RECORDS (OUTPATIENT)
Dept: HEALTH INFORMATION MANAGEMENT | Facility: CLINIC | Age: 34
End: 2024-07-03

## 2024-07-03 ENCOUNTER — E-VISIT (OUTPATIENT)
Dept: PEDIATRICS | Facility: CLINIC | Age: 34
End: 2024-07-03
Payer: COMMERCIAL

## 2024-07-03 VITALS
HEIGHT: 61 IN | DIASTOLIC BLOOD PRESSURE: 78 MMHG | RESPIRATION RATE: 16 BRPM | TEMPERATURE: 98.5 F | WEIGHT: 176.1 LBS | BODY MASS INDEX: 33.25 KG/M2 | SYSTOLIC BLOOD PRESSURE: 114 MMHG | OXYGEN SATURATION: 100 % | HEART RATE: 76 BPM

## 2024-07-03 DIAGNOSIS — R19.7 DIARRHEA, UNSPECIFIED TYPE: Primary | ICD-10-CM

## 2024-07-03 DIAGNOSIS — R74.8 ELEVATED LIVER ENZYMES: ICD-10-CM

## 2024-07-03 DIAGNOSIS — I31.9 PERICARDITIS, UNSPECIFIED CHRONICITY, UNSPECIFIED TYPE: Primary | ICD-10-CM

## 2024-07-03 DIAGNOSIS — K21.9 GASTROESOPHAGEAL REFLUX DISEASE WITHOUT ESOPHAGITIS: ICD-10-CM

## 2024-07-03 PROCEDURE — 80076 HEPATIC FUNCTION PANEL: CPT | Performed by: PHYSICIAN ASSISTANT

## 2024-07-03 PROCEDURE — 86225 DNA ANTIBODY NATIVE: CPT | Performed by: PHYSICIAN ASSISTANT

## 2024-07-03 PROCEDURE — 99207 PR NON-BILLABLE SERV PER CHARTING: CPT | Performed by: PHYSICIAN ASSISTANT

## 2024-07-03 PROCEDURE — 86039 ANTINUCLEAR ANTIBODIES (ANA): CPT | Performed by: PHYSICIAN ASSISTANT

## 2024-07-03 PROCEDURE — 99214 OFFICE O/P EST MOD 30 MIN: CPT | Performed by: PHYSICIAN ASSISTANT

## 2024-07-03 PROCEDURE — 36415 COLL VENOUS BLD VENIPUNCTURE: CPT | Performed by: PHYSICIAN ASSISTANT

## 2024-07-03 PROCEDURE — 86038 ANTINUCLEAR ANTIBODIES: CPT | Performed by: PHYSICIAN ASSISTANT

## 2024-07-03 RX ORDER — FAMOTIDINE 20 MG/1
20 TABLET, FILM COATED ORAL 2 TIMES DAILY
Qty: 60 TABLET | Refills: 1 | Status: SHIPPED | OUTPATIENT
Start: 2024-07-03 | End: 2024-09-01

## 2024-07-03 RX ORDER — SUCRALFATE 1 G/1
1 TABLET ORAL 3 TIMES DAILY
Qty: 90 TABLET | Refills: 1 | Status: SHIPPED | OUTPATIENT
Start: 2024-07-03 | End: 2024-09-01

## 2024-07-03 RX ORDER — SUCRALFATE 1 G/1
1 TABLET ORAL 3 TIMES DAILY
COMMUNITY
Start: 2024-06-15 | End: 2024-07-03

## 2024-07-03 ASSESSMENT — PAIN SCALES - GENERAL: PAINLEVEL: NO PAIN (1)

## 2024-07-03 NOTE — PROGRESS NOTES
"  Assessment & Plan     Pericarditis, unspecified chronicity, unspecified type  Obtain labs for further evaluation. Rheum appointment next week.  - Anti Nuclear Katy IgG by IFA with Reflex; Future  - DNA double stranded antibodies; Future  - Anti Nuclear Katy IgG by IFA with Reflex  - DNA double stranded antibodies    Gastroesophageal reflux disease without esophagitis  Refills given. Patient has upcoming EGD/colonoscopy for eval of GERD and chronic diarrhea.  - famotidine (PEPCID) 20 MG tablet; Take 1 tablet (20 mg) by mouth 2 times daily  - sucralfate (CARAFATE) 1 GM tablet; Take 1 tablet (1 g) by mouth 3 times daily    Elevated liver enzymes  - Hepatic panel (Albumin, ALT, AST, Bili, Alk Phos, TP)    Dee Rae is a 33 year old, presenting for the following health issues:  Follow Up        7/3/2024    11:32 AM   Additional Questions   Roomed by S   Accompanied by self     History of Present Illness       Reason for visit:  GI issues    She eats 2-3 servings of fruits and vegetables daily.She consumes 0 sweetened beverage(s) daily.She exercises with enough effort to increase her heart rate 9 or less minutes per day.  She exercises with enough effort to increase her heart rate 3 or less days per week.   She is taking medications regularly.     ED/UC Followup:    Facility:  [facility] ED   Date of visit: 06/15/2024  Reason for visit: abdominal pain   Current Status: was prescribed sucralfate (CARAFATE) 1 gram tablet & famotidine (PEPCID) 10 mg tablet. Patient states that the pain comes and goes but has improved since hospital visit. Pt reports having a larger meal yesterday and felt \"icky\" after. She has been trying to avoid certain foods that could upset the stomach.   Reviewed OV notes. Discussed ongoing symptoms.     Patient has rheum appointment next week.      Review of Systems  Constitutional, HEENT, cardiovascular, pulmonary, gi and gu systems are negative, except as otherwise noted.    " "  Objective    /78 (BP Location: Right arm, Patient Position: Sitting, Cuff Size: Adult Large)   Pulse 76   Temp 98.5  F (36.9  C) (Tympanic)   Resp 16   Ht 1.549 m (5' 1\")   Wt 79.9 kg (176 lb 1.6 oz)   SpO2 100%   BMI 33.27 kg/m    Body mass index is 33.27 kg/m .  Physical Exam   GENERAL: alert and no distress  EYES: Eyes grossly normal to inspection, PERRL and conjunctivae and sclerae normal  Results for orders placed or performed in visit on 07/03/24   Hepatic panel (Albumin, ALT, AST, Bili, Alk Phos, TP)     Status: Abnormal   Result Value Ref Range    Protein Total 7.2 6.4 - 8.3 g/dL    Albumin 4.6 3.5 - 5.2 g/dL    Bilirubin Total 0.4 <=1.2 mg/dL    Alkaline Phosphatase 68 40 - 150 U/L    AST 29 0 - 45 U/L    ALT 64 (H) 0 - 50 U/L    Bilirubin Direct <0.20 0.00 - 0.30 mg/dL   Anti Nuclear Katy IgG by IFA with Reflex     Status: Abnormal   Result Value Ref Range    ALVIN interpretation Borderline Positive (A) Negative    ALVIN pattern 1 Speckled     ALVIN titer 1 1:40            Signed Electronically by: Angela Wu PA-C    "

## 2024-07-04 LAB
ALBUMIN SERPL BCG-MCNC: 4.6 G/DL (ref 3.5–5.2)
ALP SERPL-CCNC: 68 U/L (ref 40–150)
ALT SERPL W P-5'-P-CCNC: 64 U/L (ref 0–50)
AST SERPL W P-5'-P-CCNC: 29 U/L (ref 0–45)
BILIRUB DIRECT SERPL-MCNC: <0.2 MG/DL (ref 0–0.3)
BILIRUB SERPL-MCNC: 0.4 MG/DL
PROT SERPL-MCNC: 7.2 G/DL (ref 6.4–8.3)

## 2024-07-05 LAB
ANA PAT SER IF-IMP: ABNORMAL
ANA SER QL IF: ABNORMAL
ANA TITR SER IF: ABNORMAL {TITER}

## 2024-07-06 ENCOUNTER — MYC REFILL (OUTPATIENT)
Dept: PEDIATRICS | Facility: CLINIC | Age: 34
End: 2024-07-06

## 2024-07-06 DIAGNOSIS — G47.00 INSOMNIA, UNSPECIFIED TYPE: Primary | ICD-10-CM

## 2024-07-07 ENCOUNTER — MYC MEDICAL ADVICE (OUTPATIENT)
Dept: PEDIATRICS | Facility: CLINIC | Age: 34
End: 2024-07-07
Payer: COMMERCIAL

## 2024-07-08 LAB — DSDNA AB SER-ACNC: 1.2 IU/ML

## 2024-07-08 RX ORDER — TRAZODONE HYDROCHLORIDE 50 MG/1
50 TABLET, FILM COATED ORAL AT BEDTIME
Qty: 90 TABLET | Refills: 3 | Status: SHIPPED | OUTPATIENT
Start: 2024-07-08

## 2024-07-10 ENCOUNTER — HOSPITAL ENCOUNTER (OUTPATIENT)
Facility: CLINIC | Age: 34
End: 2024-07-10
Attending: INTERNAL MEDICINE | Admitting: INTERNAL MEDICINE
Payer: COMMERCIAL

## 2024-07-15 ENCOUNTER — VIRTUAL VISIT (OUTPATIENT)
Dept: PEDIATRICS | Facility: CLINIC | Age: 34
End: 2024-07-15
Payer: COMMERCIAL

## 2024-07-15 ENCOUNTER — TRANSFERRED RECORDS (OUTPATIENT)
Dept: HEALTH INFORMATION MANAGEMENT | Facility: CLINIC | Age: 34
End: 2024-07-15

## 2024-07-15 DIAGNOSIS — R74.8 ELEVATED LIVER ENZYMES: ICD-10-CM

## 2024-07-15 DIAGNOSIS — I31.9 PERICARDITIS, UNSPECIFIED CHRONICITY, UNSPECIFIED TYPE: ICD-10-CM

## 2024-07-15 DIAGNOSIS — R10.13 DYSPEPSIA: ICD-10-CM

## 2024-07-15 DIAGNOSIS — G47.00 INSOMNIA, UNSPECIFIED TYPE: Primary | ICD-10-CM

## 2024-07-15 PROCEDURE — 99442 PR PHYSICIAN TELEPHONE EVALUATION 11-20 MIN: CPT | Performed by: NURSE PRACTITIONER

## 2024-07-15 NOTE — PROGRESS NOTES
"Kyra is a 33 year old who is being evaluated via a billable telephone visit.      Originating Location (pt. Location): Home    Distant Location (provider location):  Off-site    Assessment & Plan     Insomnia, unspecified type  Improved on Trazodone.     Pericarditis, unspecified chronicity, unspecified type  Waxing and waning. Currently stable on colchicine and ibuprofen 700mg TID without symptoms.   -Follow-up with cardiology if symptomatic. However, has a list of labs that her cardiologist wanted drawn if she were symptomatic and she will send this to me so I have it if she does have a flare. However, we discussed that I'd like her to be in touch with cardiology if she has a flare as this is not something I'm comfortable managing.   - Comprehensive metabolic panel (BMP + Alb, Alk Phos, ALT, AST, Total. Bili, TP); Future  -Pt wants to monitor kidney/liver function 1 month given long term colchicine and NSAIDs    Elevated liver enzymes  Unclear cause. Liver ultrasound normal. Minimal ETOH  -Recheck LFTs 1 month    Dyspepsia  Improved on sucralfate, omeprazole, and prn pepcid. Following with [facility]. Trying to get an endoscopy but anesthesia won't do it with MAC. Wants her to have general. Appointments for endoscopy under this type of sedation are booked out.           BMI  Estimated body mass index is 33.27 kg/m  as calculated from the following:    Height as of 7/3/24: 1.549 m (5' 1\").    Weight as of 7/3/24: 79.9 kg (176 lb 1.6 oz).             Subjective   Kyra is a 33 year old, presenting for the following health issues:  No chief complaint on file.    HPI     Review of Systems  Constitutional, neuro, ENT, endocrine, pulmonary, cardiac, gastrointestinal, genitourinary, musculoskeletal, integument and psychiatric systems are negative, except as otherwise noted.      Objective         Vitals:  No vitals were obtained today due to virtual visit.    Physical Exam   General: Alert and no distress " //Respiratory: No audible wheeze, cough, or shortness of breath // Psychiatric:  Appropriate affect, tone, and pace of words    Phone call duration: 20The longitudinal plan of care for the diagnosis(es)/condition(s) as documented were addressed during this visit. Due to the added complexity in care, I will continue to support Kyra in the subsequent management and with ongoing continuity of care. minutes  Signed Electronically by: JAZMINE HATFIELD CNP

## 2024-07-30 ENCOUNTER — TRANSFERRED RECORDS (OUTPATIENT)
Dept: HEALTH INFORMATION MANAGEMENT | Facility: CLINIC | Age: 34
End: 2024-07-30

## 2024-07-31 ENCOUNTER — LAB (OUTPATIENT)
Dept: LAB | Facility: CLINIC | Age: 34
End: 2024-07-31
Payer: COMMERCIAL

## 2024-07-31 DIAGNOSIS — I31.9 PERICARDITIS, UNSPECIFIED CHRONICITY, UNSPECIFIED TYPE: ICD-10-CM

## 2024-07-31 DIAGNOSIS — C22.0 HCC (HEPATOCELLULAR CARCINOMA) (H): ICD-10-CM

## 2024-07-31 DIAGNOSIS — R07.89 OTHER CHEST PAIN: Primary | ICD-10-CM

## 2024-07-31 DIAGNOSIS — I31.9 PERICARDITIS: ICD-10-CM

## 2024-07-31 LAB
ALBUMIN SERPL BCG-MCNC: 4.4 G/DL (ref 3.5–5.2)
ALP SERPL-CCNC: 70 U/L (ref 40–150)
ALT SERPL W P-5'-P-CCNC: 54 U/L (ref 0–50)
ANION GAP SERPL CALCULATED.3IONS-SCNC: 9 MMOL/L (ref 7–15)
AST SERPL W P-5'-P-CCNC: 33 U/L (ref 0–45)
BILIRUB SERPL-MCNC: 0.3 MG/DL
BUN SERPL-MCNC: 10.7 MG/DL (ref 6–20)
CALCIUM SERPL-MCNC: 9.4 MG/DL (ref 8.8–10.4)
CHLORIDE SERPL-SCNC: 107 MMOL/L (ref 98–107)
CREAT SERPL-MCNC: 0.94 MG/DL (ref 0.51–0.95)
CRP SERPL-MCNC: <3 MG/L
EGFRCR SERPLBLD CKD-EPI 2021: 82 ML/MIN/1.73M2
ERYTHROCYTE [SEDIMENTATION RATE] IN BLOOD BY WESTERGREN METHOD: 7 MM/HR (ref 0–20)
GLUCOSE SERPL-MCNC: 87 MG/DL (ref 70–99)
HCO3 SERPL-SCNC: 23 MMOL/L (ref 22–29)
POTASSIUM SERPL-SCNC: 4.2 MMOL/L (ref 3.4–5.3)
PROT SERPL-MCNC: 7 G/DL (ref 6.4–8.3)
SODIUM SERPL-SCNC: 139 MMOL/L (ref 135–145)

## 2024-07-31 PROCEDURE — 36415 COLL VENOUS BLD VENIPUNCTURE: CPT

## 2024-07-31 PROCEDURE — 85652 RBC SED RATE AUTOMATED: CPT

## 2024-07-31 PROCEDURE — 80053 COMPREHEN METABOLIC PANEL: CPT

## 2024-07-31 PROCEDURE — 86140 C-REACTIVE PROTEIN: CPT

## 2024-08-02 ENCOUNTER — TRANSFERRED RECORDS (OUTPATIENT)
Dept: HEALTH INFORMATION MANAGEMENT | Facility: CLINIC | Age: 34
End: 2024-08-02
Payer: COMMERCIAL

## 2024-08-04 DIAGNOSIS — K21.9 GASTROESOPHAGEAL REFLUX DISEASE WITHOUT ESOPHAGITIS: ICD-10-CM

## 2024-08-05 RX ORDER — FAMOTIDINE 20 MG/1
20 TABLET, FILM COATED ORAL 2 TIMES DAILY
Qty: 60 TABLET | Refills: 1 | OUTPATIENT
Start: 2024-08-05

## 2024-08-14 ENCOUNTER — TRANSFERRED RECORDS (OUTPATIENT)
Dept: HEALTH INFORMATION MANAGEMENT | Facility: CLINIC | Age: 34
End: 2024-08-14
Payer: COMMERCIAL

## 2024-08-28 ENCOUNTER — TRANSFERRED RECORDS (OUTPATIENT)
Dept: HEALTH INFORMATION MANAGEMENT | Facility: CLINIC | Age: 34
End: 2024-08-28
Payer: COMMERCIAL

## 2024-09-01 DIAGNOSIS — K21.9 GASTROESOPHAGEAL REFLUX DISEASE WITHOUT ESOPHAGITIS: ICD-10-CM

## 2024-09-01 RX ORDER — FAMOTIDINE 20 MG/1
20 TABLET, FILM COATED ORAL 2 TIMES DAILY
Qty: 60 TABLET | Refills: 0 | Status: SHIPPED | OUTPATIENT
Start: 2024-09-01 | End: 2024-09-03

## 2024-09-01 RX ORDER — SUCRALFATE 1 G/1
1 TABLET ORAL 3 TIMES DAILY
Qty: 90 TABLET | Refills: 0 | Status: SHIPPED | OUTPATIENT
Start: 2024-09-01 | End: 2024-09-03

## 2024-09-03 RX ORDER — FAMOTIDINE 20 MG/1
20 TABLET, FILM COATED ORAL 2 TIMES DAILY
Qty: 180 TABLET | Refills: 1 | Status: SHIPPED | OUTPATIENT
Start: 2024-09-03

## 2024-09-03 RX ORDER — SUCRALFATE 1 G/1
1 TABLET ORAL 3 TIMES DAILY
Qty: 270 TABLET | Refills: 1 | Status: SHIPPED | OUTPATIENT
Start: 2024-09-03

## 2024-09-11 ENCOUNTER — LAB (OUTPATIENT)
Dept: LAB | Facility: CLINIC | Age: 34
End: 2024-09-11
Payer: COMMERCIAL

## 2024-09-11 DIAGNOSIS — I31.9 DISEASE OF PERICARDIUM: Primary | ICD-10-CM

## 2024-09-11 PROCEDURE — 86038 ANTINUCLEAR ANTIBODIES: CPT

## 2024-09-11 PROCEDURE — 86225 DNA ANTIBODY NATIVE: CPT

## 2024-09-11 PROCEDURE — 86039 ANTINUCLEAR ANTIBODIES (ANA): CPT

## 2024-09-11 PROCEDURE — 36415 COLL VENOUS BLD VENIPUNCTURE: CPT

## 2024-09-12 LAB
ANA PAT SER IF-IMP: ABNORMAL
ANA SER QL IF: ABNORMAL
ANA TITR SER IF: ABNORMAL {TITER}
DSDNA AB SER-ACNC: 0.7 IU/ML

## 2024-10-08 ENCOUNTER — MYC MEDICAL ADVICE (OUTPATIENT)
Dept: PEDIATRICS | Facility: CLINIC | Age: 34
End: 2024-10-08
Payer: COMMERCIAL

## 2024-10-08 ENCOUNTER — VIRTUAL VISIT (OUTPATIENT)
Dept: FAMILY MEDICINE | Facility: CLINIC | Age: 34
End: 2024-10-08
Payer: COMMERCIAL

## 2024-10-08 DIAGNOSIS — Z79.1 NSAID LONG-TERM USE: ICD-10-CM

## 2024-10-08 DIAGNOSIS — I30.8 OTHER ACUTE PERICARDITIS: Primary | ICD-10-CM

## 2024-10-08 PROCEDURE — 99213 OFFICE O/P EST LOW 20 MIN: CPT | Mod: 95 | Performed by: PHYSICIAN ASSISTANT

## 2024-10-08 PROCEDURE — G2211 COMPLEX E/M VISIT ADD ON: HCPCS | Mod: 95 | Performed by: PHYSICIAN ASSISTANT

## 2024-10-08 RX ORDER — PANTOPRAZOLE SODIUM 40 MG/1
TABLET, DELAYED RELEASE ORAL
COMMUNITY
Start: 2024-09-01

## 2024-10-08 NOTE — PROGRESS NOTES
Kyra is a 34 year old who is being evaluated via a billable video visit.    How would you like to obtain your AVS? MyChart  If the video visit is dropped, the invitation should be resent by: Text to cell phone: [phone]  Will anyone else be joining your video visit? No      Assessment & Plan     Other acute pericarditis  NSAID long-term use  Reassured symptoms are gradually improving. Continue increased dose of ibuprofen over the next few days.  Check inflammatory markers as well as CBC and CMP.  Encouraged her to stay well-hydrated.  Reviewed signs symptoms that warrant urgent/emergent reevaluation in the meantime.  Hopefully she continues to feel better she can reduce her dose of ibuprofen.  Continue colchicine.  Follow-up with cardiology/PCP.    - CBC with platelets and differential  - Comprehensive metabolic panel (BMP + Alb, Alk Phos, ALT, AST, Total. Bili, TP)  - CRP, inflammation  - ESR: Erythrocyte sedimentation rate      20 minutes spent by me on the date of the encounter doing chart review, review of outside records, review of test results, interpretation of tests, patient visit, and documentation     The longitudinal plan of care for the diagnosis(es)/condition(s) as documented were addressed during this visit. Due to the added complexity in care, I will continue to support Kyra in the subsequent management and with ongoing continuity of care.    Subjective   Kyra is a 34 year old, presenting for the following health issues:  Chest Pain (Pericarditis dx in January, pt now experiencing some stomach pain and wondering if it can be related sx) and LAB REQUEST (Liver and kidney function check)    Here via phone visit to discuss recent COVID-19 infection and recurrence of pericarditis symptoms.  Urgent diagnosed with pericarditis in January.  Had flareup in May.  Daily since then has been taking 1.2 mg of colchicine in addition to 1400 mg of ibuprofen.  Diagnosed with COVID approximately 2 weeks ago.   Recovered.  However, following this and during began experiencing similar symptoms to when she had pericarditis including a weighted feeling across her chest.  Achy.  As well as a description of pins/needles when breathing.  More comfortable sitting up than laying flat.  When the symptoms occurred over the past few days she did increase her ibuprofen to 1800 mg daily.  Thankfully she is taking pantoprazole as well as sucralfate to help protect her stomach.  Notes improvement in her symptoms over the past day.  Wishes to get blood work checked she feels a generalized soreness in her stomach.  Has previously seen GI where she got an upper endoscopy as well as a colonoscopy.  Upper endoscopy revealed gastritis.  Scheduled to see cardiology in January at [facility].      Review of Systems  Constitutional, neuro, ENT, endocrine, pulmonary, cardiac, gastrointestinal, genitourinary, musculoskeletal, integument and psychiatric systems are negative, except as otherwise noted.      Objective    Vitals - Patient Reported  Pain Score: Mild Pain (2)        Physical Exam   GENERAL: alert and no distress  EYES: Eyes grossly normal to inspection.  No discharge or erythema, or obvious scleral/conjunctival abnormalities.  RESP: No audible wheeze, cough, or visible cyanosis.    SKIN: Visible skin clear. No significant rash, abnormal pigmentation or lesions.  NEURO: Cranial nerves grossly intact.  Mentation and speech appropriate for age.  PSYCH: Appropriate affect, tone, and pace of words          Video-Visit Details    Type of service:  Video Visit   Originating Location (pt. Location): Home    Distant Location (provider location):  On-site  Platform used for Video Visit: Conrad  Signed Electronically by: Luis Julio PA-C    Answers submitted by the patient for this visit:  General Questionnaire (Submitted on 10/8/2024)  Chief Complaint: Chronic problems general questions HPI Form  What is the reason for your visit today? :  pericarditis

## 2024-10-09 ENCOUNTER — LAB (OUTPATIENT)
Dept: LAB | Facility: CLINIC | Age: 34
End: 2024-10-09
Payer: COMMERCIAL

## 2024-10-09 DIAGNOSIS — Z79.1 NSAID LONG-TERM USE: ICD-10-CM

## 2024-10-09 DIAGNOSIS — I30.8 OTHER ACUTE PERICARDITIS: ICD-10-CM

## 2024-10-09 LAB
ALBUMIN SERPL BCG-MCNC: 4.3 G/DL (ref 3.5–5.2)
ALP SERPL-CCNC: 99 U/L (ref 40–150)
ALT SERPL W P-5'-P-CCNC: 194 U/L (ref 0–50)
ANION GAP SERPL CALCULATED.3IONS-SCNC: 10 MMOL/L (ref 7–15)
AST SERPL W P-5'-P-CCNC: 69 U/L (ref 0–45)
BASOPHILS # BLD AUTO: 0.1 10E3/UL (ref 0–0.2)
BASOPHILS NFR BLD AUTO: 2 %
BILIRUB SERPL-MCNC: 0.4 MG/DL
BUN SERPL-MCNC: 16.7 MG/DL (ref 6–20)
CALCIUM SERPL-MCNC: 9.3 MG/DL (ref 8.8–10.4)
CHLORIDE SERPL-SCNC: 106 MMOL/L (ref 98–107)
CREAT SERPL-MCNC: 0.86 MG/DL (ref 0.51–0.95)
CRP SERPL-MCNC: 3.5 MG/L
EGFRCR SERPLBLD CKD-EPI 2021: >90 ML/MIN/1.73M2
EOSINOPHIL # BLD AUTO: 0.2 10E3/UL (ref 0–0.7)
EOSINOPHIL NFR BLD AUTO: 5 %
ERYTHROCYTE [DISTWIDTH] IN BLOOD BY AUTOMATED COUNT: 12.7 % (ref 10–15)
ERYTHROCYTE [SEDIMENTATION RATE] IN BLOOD BY WESTERGREN METHOD: 10 MM/HR (ref 0–20)
GLUCOSE SERPL-MCNC: 91 MG/DL (ref 70–99)
HCO3 SERPL-SCNC: 23 MMOL/L (ref 22–29)
HCT VFR BLD AUTO: 39.3 % (ref 35–47)
HGB BLD-MCNC: 13.2 G/DL (ref 11.7–15.7)
IMM GRANULOCYTES # BLD: 0 10E3/UL
IMM GRANULOCYTES NFR BLD: 0 %
LYMPHOCYTES # BLD AUTO: 1.2 10E3/UL (ref 0.8–5.3)
LYMPHOCYTES NFR BLD AUTO: 30 %
MCH RBC QN AUTO: 29.6 PG (ref 26.5–33)
MCHC RBC AUTO-ENTMCNC: 33.6 G/DL (ref 31.5–36.5)
MCV RBC AUTO: 88 FL (ref 78–100)
MONOCYTES # BLD AUTO: 0.3 10E3/UL (ref 0–1.3)
MONOCYTES NFR BLD AUTO: 8 %
NEUTROPHILS # BLD AUTO: 2.2 10E3/UL (ref 1.6–8.3)
NEUTROPHILS NFR BLD AUTO: 56 %
PLATELET # BLD AUTO: 257 10E3/UL (ref 150–450)
POTASSIUM SERPL-SCNC: 4.1 MMOL/L (ref 3.4–5.3)
PROT SERPL-MCNC: 7.1 G/DL (ref 6.4–8.3)
RBC # BLD AUTO: 4.46 10E6/UL (ref 3.8–5.2)
SODIUM SERPL-SCNC: 139 MMOL/L (ref 135–145)
WBC # BLD AUTO: 4 10E3/UL (ref 4–11)

## 2024-10-09 PROCEDURE — 85025 COMPLETE CBC W/AUTO DIFF WBC: CPT

## 2024-10-09 PROCEDURE — 80053 COMPREHEN METABOLIC PANEL: CPT

## 2024-10-09 PROCEDURE — 85652 RBC SED RATE AUTOMATED: CPT

## 2024-10-09 PROCEDURE — 86140 C-REACTIVE PROTEIN: CPT

## 2024-10-09 PROCEDURE — 36415 COLL VENOUS BLD VENIPUNCTURE: CPT

## 2024-10-10 NOTE — RESULT ENCOUNTER NOTE
Porter Rae,     Inflammatory markers are within the normal range.  Blood counts are stable.  Electrolytes and kidney function are normal.  However liver tests are elevated.  Be mindful of alcohol as well as Tylenol intake.  Follow-up with PCP/gastro in regards to this.

## 2024-11-08 ENCOUNTER — MYC REFILL (OUTPATIENT)
Dept: CARDIOLOGY | Facility: CLINIC | Age: 34
End: 2024-11-08
Payer: COMMERCIAL

## 2024-11-08 DIAGNOSIS — I30.8 OTHER ACUTE PERICARDITIS: ICD-10-CM

## 2024-11-08 RX ORDER — COLCHICINE 0.6 MG/1
0.6 TABLET ORAL 2 TIMES DAILY
Qty: 180 TABLET | Refills: 1 | OUTPATIENT
Start: 2024-11-08

## 2024-11-11 ENCOUNTER — HOSPITAL ENCOUNTER (OUTPATIENT)
Dept: CARDIOLOGY | Facility: CLINIC | Age: 34
Discharge: HOME | End: 2024-11-11
Attending: NURSE PRACTITIONER | Admitting: NURSE PRACTITIONER
Payer: COMMERCIAL

## 2024-11-11 DIAGNOSIS — I31.39 PERICARDIAL EFFUSION: ICD-10-CM

## 2024-11-11 LAB — LVEF ECHO: NORMAL

## 2024-11-11 PROCEDURE — 93325 DOPPLER ECHO COLOR FLOW MAPG: CPT | Mod: 26 | Performed by: INTERNAL MEDICINE

## 2024-11-11 PROCEDURE — 93325 DOPPLER ECHO COLOR FLOW MAPG: CPT

## 2024-11-11 PROCEDURE — 93308 TTE F-UP OR LMTD: CPT | Mod: 26 | Performed by: INTERNAL MEDICINE

## 2024-11-11 PROCEDURE — 93308 TTE F-UP OR LMTD: CPT

## 2024-11-11 PROCEDURE — 93321 DOPPLER ECHO F-UP/LMTD STD: CPT | Mod: 26 | Performed by: INTERNAL MEDICINE

## 2024-11-18 ENCOUNTER — DOCUMENTATION ONLY (OUTPATIENT)
Dept: LAB | Facility: CLINIC | Age: 34
End: 2024-11-18
Payer: COMMERCIAL

## 2024-11-18 DIAGNOSIS — Z13.220 SCREENING FOR HYPERLIPIDEMIA: Primary | ICD-10-CM

## 2024-11-18 NOTE — PROGRESS NOTES
Patient is requesting additional labs. Please order as needed.    Labs: Lipids    Lab Appointment: 11/19/24

## 2024-11-19 ENCOUNTER — LAB (OUTPATIENT)
Dept: LAB | Facility: CLINIC | Age: 34
End: 2024-11-19
Payer: COMMERCIAL

## 2024-11-19 DIAGNOSIS — Z13.220 SCREENING FOR HYPERLIPIDEMIA: ICD-10-CM

## 2024-11-19 DIAGNOSIS — R79.89 ELEVATED LIVER FUNCTION TESTS: ICD-10-CM

## 2024-11-19 PROCEDURE — 36415 COLL VENOUS BLD VENIPUNCTURE: CPT

## 2024-11-19 PROCEDURE — 80076 HEPATIC FUNCTION PANEL: CPT

## 2024-11-19 PROCEDURE — 80061 LIPID PANEL: CPT

## 2024-11-20 LAB
ALBUMIN SERPL BCG-MCNC: 4.6 G/DL (ref 3.5–5.2)
ALP SERPL-CCNC: 83 U/L (ref 40–150)
ALT SERPL W P-5'-P-CCNC: 216 U/L (ref 0–50)
AST SERPL W P-5'-P-CCNC: 131 U/L (ref 0–45)
BILIRUB DIRECT SERPL-MCNC: <0.2 MG/DL (ref 0–0.3)
BILIRUB SERPL-MCNC: 0.4 MG/DL
CHOLEST SERPL-MCNC: 157 MG/DL
FASTING STATUS PATIENT QL REPORTED: NO
HDLC SERPL-MCNC: 69 MG/DL
LDLC SERPL CALC-MCNC: 72 MG/DL
NONHDLC SERPL-MCNC: 88 MG/DL
PROT SERPL-MCNC: 7.3 G/DL (ref 6.4–8.3)
TRIGL SERPL-MCNC: 78 MG/DL

## 2024-11-21 LAB — GGT SERPL-CCNC: 55 U/L (ref 5–36)

## 2024-11-22 ENCOUNTER — DOCUMENTATION ONLY (OUTPATIENT)
Dept: PEDIATRICS | Facility: CLINIC | Age: 34
End: 2024-11-22

## 2024-11-22 NOTE — PROGRESS NOTES
Please ask lab if all the orders I placed this week were able to be added on. If so, pls have pt cancel lab visit

## 2024-11-25 ENCOUNTER — LAB (OUTPATIENT)
Dept: LAB | Facility: CLINIC | Age: 34
End: 2024-11-25
Payer: COMMERCIAL

## 2024-11-25 DIAGNOSIS — I31.9 DISEASE OF PERICARDIUM: Primary | ICD-10-CM

## 2024-11-25 LAB
ALP SERPL-CCNC: 86 U/L (ref 40–150)
BILIRUB DIRECT SERPL-MCNC: <0.2 MG/DL (ref 0–0.3)
BILIRUB SERPL-MCNC: 0.4 MG/DL
FACT V ACT/NOR PPP: 94 % (ref 60–140)
GGT SERPL-CCNC: 60 U/L (ref 5–36)
INR PPP: 1.02 (ref 0.85–1.15)

## 2024-11-25 PROCEDURE — 36415 COLL VENOUS BLD VENIPUNCTURE: CPT

## 2024-11-25 PROCEDURE — 82247 BILIRUBIN TOTAL: CPT

## 2024-11-25 PROCEDURE — 85220 BLOOC CLOT FACTOR V TEST: CPT

## 2024-11-25 PROCEDURE — 84075 ASSAY ALKALINE PHOSPHATASE: CPT

## 2024-11-25 PROCEDURE — 82977 ASSAY OF GGT: CPT

## 2024-11-25 PROCEDURE — 82248 BILIRUBIN DIRECT: CPT

## 2024-11-25 PROCEDURE — 85610 PROTHROMBIN TIME: CPT

## 2024-11-27 ENCOUNTER — LAB (OUTPATIENT)
Dept: LAB | Facility: CLINIC | Age: 34
End: 2024-11-27
Payer: COMMERCIAL

## 2024-11-27 DIAGNOSIS — I31.9 PERICARDITIS: ICD-10-CM

## 2024-11-27 DIAGNOSIS — R74.8 ELEVATED LIVER ENZYMES: ICD-10-CM

## 2024-11-27 LAB
ALBUMIN SERPL BCG-MCNC: 4.5 G/DL (ref 3.5–5.2)
ALP SERPL-CCNC: 95 U/L (ref 40–150)
ALT SERPL W P-5'-P-CCNC: 218 U/L (ref 0–50)
AST SERPL W P-5'-P-CCNC: 87 U/L (ref 0–45)
BILIRUB DIRECT SERPL-MCNC: <0.2 MG/DL (ref 0–0.3)
BILIRUB SERPL-MCNC: 0.5 MG/DL
PROT SERPL-MCNC: 7.4 G/DL (ref 6.4–8.3)

## 2024-11-27 PROCEDURE — 36415 COLL VENOUS BLD VENIPUNCTURE: CPT

## 2024-11-27 PROCEDURE — 80076 HEPATIC FUNCTION PANEL: CPT

## 2025-07-12 NOTE — ED TRIAGE NOTES
"Was seen early AM yesterday with full workup. Ab pain has improved.   Has been dx with pericarditis since Jan.  Has been tapering off ibuprofen.  Reports \"burning sensation\" with deep breaths.  Still feeling pressure/burning sensation in chest and more concerned about pericarditis returning.       " Les Archibald(Attending)